# Patient Record
Sex: FEMALE | Race: WHITE | NOT HISPANIC OR LATINO | Employment: FULL TIME | URBAN - METROPOLITAN AREA
[De-identification: names, ages, dates, MRNs, and addresses within clinical notes are randomized per-mention and may not be internally consistent; named-entity substitution may affect disease eponyms.]

---

## 2017-02-02 ENCOUNTER — PATIENT MESSAGE (OUTPATIENT)
Dept: OBSTETRICS AND GYNECOLOGY | Facility: CLINIC | Age: 37
End: 2017-02-02

## 2017-02-02 ENCOUNTER — TELEPHONE (OUTPATIENT)
Dept: OBSTETRICS AND GYNECOLOGY | Facility: CLINIC | Age: 37
End: 2017-02-02

## 2017-02-02 ENCOUNTER — OFFICE VISIT (OUTPATIENT)
Dept: OBSTETRICS AND GYNECOLOGY | Facility: CLINIC | Age: 37
End: 2017-02-02
Payer: COMMERCIAL

## 2017-02-02 ENCOUNTER — LAB VISIT (OUTPATIENT)
Dept: LAB | Facility: HOSPITAL | Age: 37
End: 2017-02-02
Payer: COMMERCIAL

## 2017-02-02 VITALS
DIASTOLIC BLOOD PRESSURE: 72 MMHG | HEIGHT: 67 IN | WEIGHT: 155.88 LBS | SYSTOLIC BLOOD PRESSURE: 116 MMHG | BODY MASS INDEX: 24.47 KG/M2

## 2017-02-02 DIAGNOSIS — Z82.49 FAMILY HISTORY OF HEART ATTACK: ICD-10-CM

## 2017-02-02 DIAGNOSIS — Z13.220 SCREENING CHOLESTEROL LEVEL: ICD-10-CM

## 2017-02-02 DIAGNOSIS — Z12.31 SCREENING MAMMOGRAM FOR HIGH-RISK PATIENT: ICD-10-CM

## 2017-02-02 DIAGNOSIS — Z13.1 DIABETES MELLITUS SCREENING: ICD-10-CM

## 2017-02-02 DIAGNOSIS — E78.2 MIXED HYPERLIPIDEMIA: ICD-10-CM

## 2017-02-02 DIAGNOSIS — R92.8 ABNORMAL MAMMOGRAM: ICD-10-CM

## 2017-02-02 DIAGNOSIS — Z01.419 WOMEN'S ANNUAL ROUTINE GYNECOLOGICAL EXAMINATION: Primary | ICD-10-CM

## 2017-02-02 DIAGNOSIS — Z72.0 TOBACCO USE: ICD-10-CM

## 2017-02-02 LAB
CHOLEST/HDLC SERPL: 3.4 {RATIO}
HDL/CHOLESTEROL RATIO: 29.2 %
HDLC SERPL-MCNC: 236 MG/DL
HDLC SERPL-MCNC: 69 MG/DL
LDLC SERPL CALC-MCNC: 146 MG/DL
NONHDLC SERPL-MCNC: 167 MG/DL
TRIGL SERPL-MCNC: 105 MG/DL

## 2017-02-02 PROCEDURE — 99999 PR PBB SHADOW E&M-EST. PATIENT-LVL IV: CPT | Mod: PBBFAC,,, | Performed by: NURSE PRACTITIONER

## 2017-02-02 PROCEDURE — 80061 LIPID PANEL: CPT

## 2017-02-02 PROCEDURE — 88175 CYTOPATH C/V AUTO FLUID REDO: CPT

## 2017-02-02 PROCEDURE — 83036 HEMOGLOBIN GLYCOSYLATED A1C: CPT

## 2017-02-02 PROCEDURE — 99395 PREV VISIT EST AGE 18-39: CPT | Mod: S$GLB,,, | Performed by: NURSE PRACTITIONER

## 2017-02-02 PROCEDURE — 87624 HPV HI-RISK TYP POOLED RSLT: CPT

## 2017-02-02 RX ORDER — LINACLOTIDE 145 UG/1
CAPSULE, GELATIN COATED ORAL
Refills: 3 | COMMUNITY
Start: 2016-12-15 | End: 2017-09-25

## 2017-02-02 NOTE — TELEPHONE ENCOUNTER
Dr. Baptiste patient was just in this morning-was given a referral for Cardiologist in [place] and doesn't want to go down there,can you refer someone else?and E>J> just mailed her a letter about repeated her ultrasound of breast and made an appt for a stephen- is this the correct appt to have?Please call patient to further discuss at [phone]

## 2017-02-02 NOTE — PATIENT INSTRUCTIONS
Things to Remember about Feminine Hygiene and Safety   1. Wipe from front to back after using the bathroom   2. If possible, urinate before and definitely after sexual intercourse or masturbation   3. I recommend bathing with liquid soap vs a bar soap. Non-scented antibacterial soap: Dial or Neutrogena soap   4. Shower or rinse off before sitting in a bathtub full of dirty water   5. Do not douche of any kind   6. It is recommended that you take in plenty of fluids. Eight glasses of 8 ounces of water is recommended daily (UNLESS MEDICAL PROBLEMS INDICATE OTHERWISE)   7. It is recommended to change tampons and pads every 2-3 hours or as saturated   8. Use condoms to protect yourself against Sexually Transmitted Infections   9. Wear your seat belt    Self Breast Exam/Awareness (SBE)  1. Examine your breast monthly, about 4-7 days after the start of your cycle. If you no longer get a cycle pick the same day of the month to perform SBE.  2. Look for any dimpling, rashes, nipple discharge, or changes in breast size  3. Feel for any lumps or hard nodules (example, frozen vegetable) using the pads of your fingers. Make sure to examine under the arm  4. The best time to perform examine is either while in the shower or while lying down.  5. You know your breast and if you are more aware of what your breasts normally feel like then you will be able to identify changes more quickly and notify your health care provider    Tobacco Resources    Pick a start date and stick with it   OTC options: nicotine gum, patch, lozenge   Talk with your primary care doctor about prescription options: Nicotine nasal spray, Bupropion (pill), Varenicline (pill)   Inform clothes family and friends of quit date for support   Call 9-021-XOSBWGC (859-8385) to be connected with the quit line in your state (free)   Avoid being around smoking, avoid alcohol, eat healthy food and exercise   Keep positive attitude, You Can Do It!    Healthy Weight  Resources   Myfitnesspal (can be found on [employer] zoe store--free)   National Weight Control Registry    Follow the 9 inch plate method below to help with portion control. ( ½ plate vegetable, ¼ plate starch and ¼ plate protein) After Start incorporating healthy foods in each plate section.   DASH Diet--lowering blood pressure (http://www.nhlbi.nih.gov/health/public/heart/hbp/dash/new_dash.pdf)     Calcium and Vitamin D  Prepared for the subscribers of  Pharmacists Letter / Prescribers Letter to give to their patients.  Copyright © 2012 by [employer]  www.pharmacistsletter.com ~ www.prescribersletter.com  Why do I need calcium and vitamin D?  Calcium and vitamin D are needed for strong bones. Nerves, muscles, and blood  vessels need calcium to work. Vitamin D helps the body absorb calcium. Vitamin D  helps strengthen muscles and prevent falls in older people. Vitamin D might also  help prevent cancer and heart disease.  What are sources of calcium and vitamin D?  Calcium is found in foods. Dairy products are good sources. Eight ounces of yogurt  (228 gram) or milk (1 cup [236 mL]), or 1.5 oz. (43 gram) of cheese, can provide  around 300 mg. Orange juice with added calcium has 300 mg per 8 oz. (236 mL)  serving. Vitamin D is made by sun-exposed skin. It is also found in some foods.  [place] is one of the best sources. A 3 oz. (86 gram) serving of sockeye salmon has  almost 800 IU. A 3 oz. serving of tuna canned in water has about  150 IU. Dairy products with added vitamin D are good sources. Examples are a cup  of milk (115 to 124 IU) or 6 ozs. (171 grams) of yogurt (80 IU). A cup of orange  juice with added vitamin D has 80 IU. Calcium and vitamin D supplements are also  available.  Do I need a supplement? Are they safe?  Many people are low on vitamin D. It is hard to get enough vitamin D from food.  And most people dont get much sun exposure. They use sunscreens, stay indoors, or  live at a  northern latitude. So most people need a vitamin D supplement. Ask if you  should have your vitamin D level checked.  People get about 300 mg calcium from nondairy foods daily. If you eat two servings  of high-calcium foods (e.g., dairy), you can get around 900 mg per day total. Adding  a 300 mg calcium supplement daily, or a third high-calcium serving, will provide a  total of 1200 mg daily. You may have heard calcium supplements are not safe.  There has been bad press about heart attacks and prostate cancer. Calcium  supplements have not been proven unsafe. But dont go overboard with calcium  supplements. Get your calcium from diet when possible. Avoid calcium  supplements from coral or dolomite, a kind of limestone. They can contain heavy  metals like lead.  How do I choose a calcium or vitamin D supplement?  Most calcium products are calcium carbonate (e.g., Tums, Caltrate) or calcium  citrate (e.g., Citracal). Both work. Calcium carbonate doesnt cost much and  provides the most calcium per dose. Read the label to check the calcium amount  per serving. This can vary based on the type of calcium you select. Calcium  Calcium and Vitamin D  Prepared for the subscribers of  Pharmacists Letter / Prescribers Letter to give to their patients.  Copyright © 2012 by [employer]  www.pharmacistsletter.com ~ www.prescribersletter.com  citrate may be better for patients who dont absorb calcium as well. Examples are  older people or those on certain heartburn medications. Calcium is best absorbed if  not more than 500 mg is taken at a time. Some supplements contain other  ingredients (e.g., magnesium, vitamin K). These dont work any better than those  with just calcium. Vitamin D is available over-the-counter in some calcium products  or by itself. There are also high-dose vitamin D products that are prescribed if you  have low vitamin D levels. It is okay to take a multivitamin or eat vitamin  Dcontaining  foods while taking prescription-strength vitamin D. Vitamin D comes as  vitamin D2 or vitamin D3. Either can be used. In the [place] look for a vitamin D  supplement that is USP Verified. In [place], look for a product with a Natural  Product Number (NPN). These products have been tested for good quality.  How much calcium and vitamin D do I need?  Women up to 50 years old and men up to age 70 should aim for 1000 mg of calcium  daily total (from food and supplements). Women over 50 years old and men over  70 should aim for 1200 mg of calcium daily total (from food and supplements).  Most experts recommend that adults get 800 IU to 2000 IU of vitamin D daily for the  best health benefits.  [June 2012]

## 2017-02-02 NOTE — PROGRESS NOTES
Chief Complaint  · Annual Exam    HPI  1. . GINGER--36 y.o. F  presents today for WWE. Her last pap smear was in/around . The pap smear at that time was neg, and she reports a history of all normal pap smears    She has not partner changes since last STI testing. She Denies DV    2. Menstrual Cycles---Patient's last menstrual period was 2017 (exact date). Cycles have been monthly    3. Contraception--Partner with vasectomy    Health Management/Maintenance   Colonoscopy or FOBT--not indicated     Bone Density/DEXA--not indicated     Mammogram--- and abnormal with possible fibroadenoma     Lipid Panel- and not certain of exact results     Hemoglobin Q9t--7425/2016 normal    Past Medical History   Diagnosis Date    Alopecia     Anorexia      as a teenager    Esophageal reflux     Hypercholesteremia     IBS (irritable bowel syndrome)      With constipation    Left breast lump      labor in third trimester     Tobacco use      Past Surgical History   Procedure Laterality Date     section       times 2     OB History      Para Term  AB TAB SAB Ectopic Multiple Living    2 2 1 1 0  0   3        Obstetric Comments    Menarche ~10        Social History     Social History Main Topics    Smoking status: Current Every Day Smoker    Smokeless tobacco: Not on file    Alcohol use Yes      Comment: social    Drug use: No    Sexual activity: Yes     Partners: Male     Birth control/ protection: Partner-Vasectomy        ROS   Systemic: No fever, no bleeding, no recent weight loss, and no recent weight gain.  Breasts: No breast lump and no nipple discharge. No rashes  Cardiovascular: No chest pain or discomfort and no palpitations.  Pulmonary: No dyspnea, no cough, no hemoptysis, and no wheezing.  Gastrointestinal: No nausea, no bloating, no abdominal pain, and no hematochezia.  No diarrhea and no constipation.  Genitourinary: No hematuria, no urinary loss  "of control, and no dysuria.  Skin: No rash.  Visit Vitals    /72    Ht 5' 7" (1.702 m)    Wt 70.7 kg (155 lb 13.8 oz)    LMP 01/25/2017 (Exact Date)    BMI 24.41 kg/m2       Physical Exam   General Appearance: ° Well developed.  ° Well nourished.  Neck: Trachea: ° Showed no visual abnormalities.  Lymph Nodes:° No axillary adenopathy.  Breasts:General/bilateral: ° Appearance of the breast was normal.  ° Palpation of the breast revealed no abnormalities.  Lungs:°No use of accessory muscles noted or signs of respiratory distress    Cardiovascular:°No lower extremity swelling observed or palpated  Abdomen:Palpation: ° No abdominal tenderness.  ° No mass was palpated in the abdomen. °No enlarged liver or spleen noted or presence of hernia   Urinary System:Bladder: ° Normal. Urethra: ° Meatus showed no abnormalities.  ° No mass on the urethra.  Genitalia:External: ° Vulva was normal.  ° Genitalia exhibited no lesion.  Vagina: ° Mucosa was normal.  ° No vaginal discharge was observed.  Pelvic:° No ovarian mass. Cervix: ° No cervical discharge.  ° Showed no lesion.  ° Not tender. Smooth, pink and moist Uterus: ° Position was normal.  ° Did not have a mass, no enlargement.  ° Not tender. Uterine Adnexae: ° Uterine adnexa was not tender.  ° No tubal mass was noted.  Perineum:° Normal.  ° Did not have a mass.  Rectal: Rectum: ° Had no mass.  Neurological: ° No disorientation was observed.  Psychiatric: Affect: ° Normal.  Skin: ° No skin lesions/rashes visualized.  ° No perineal lesions.    Assessment/Plan:  1. WWE-annual WWE completed with a CBE.  Pap smear with HPV and then routine testing Normal CBE today.  Patient to f/u in one year for annual WWE.      2. CBE--CBE today; Pt will need a MMG now with h/o abnormal         3. Education handout on preventative Feminine Hygiene and SBE/Breast awareness was provided to the patient.       4. STD screening--not obtained      5. Weight-BMI is abnormal.  Pt to have healthy " diet and exercise. Handout on healthy resources provided     6. HCM-discussed with pt when a MMG will be needed.  The pt does not need a DEXA scan at this time. Handout on Calcium and Vit D provided on how much to take OTC and dietary     7. Contraception--partner with vasectomy    8. Smoking--discussed risks of smoking and encouraged pt to quit smoking. She will attempt on her own and if not better will send in medications    9. FHx of early MI--card referral, A1C and lipid panel, referral to [facility]    Pt to RTC prn and as scheduled for annual exam.

## 2017-02-02 NOTE — MR AVS SNAPSHOT
"    [facility]  [address]  [place] [address]  Phone: [phone]  Fax: [phone]                  Gema De Oliveira   2017 9:00 AM   Office Visit    Description:  Female : 1980   Provider:  Galina Orozco NP   Department:  [facility]           Reason for Visit     Annual Exam           Diagnoses this Visit        Comments    Women's annual routine gynecological examination    -  Primary     Tobacco use         Abnormal mammogram         Screening cholesterol level         Diabetes mellitus screening         Family history of heart attack     early with mom in 40s and dad in 30s    Mixed hyperlipidemia         Screening mammogram for high-risk patient                To Do List           Future Appointments        Provider Department Dept Phone    2017 9:50 AM LAB, [facility] [place] -  Lab       Goals (5 Years of Data)     None      Follow-Up and Disposition     Return for Annual and PRN.      [facility]     [facility] Care [facility] -  Assistance  Registered nurses in the [facility] provide clinical advisement, health education, appointment booking, and other advisory services.  Call for this free service at [phone].             Medications                Verify that the below list of medications is an accurate representation of the medications you are currently taking.  If none reported, the list may be blank. If incorrect, please contact your healthcare provider. Carry this list with you in case of emergency.           Current Medications     LINZESS 145 mcg Cap capsule TK 1 C PO ONCE D ON AN EMPTY STOMACH           Clinical Reference Information           Vital Signs - Last Recorded  Most recent update: 2017  9:14 AM by Steffanie Arango MA    BP Ht Wt LMP BMI    116/72 5' 7" (1.702 m) 70.7 kg (155 lb 13.8 oz) 2017 (Exact Date) 24.41 kg/m2      Blood Pressure          Most " Recent Value    BP  116/72      Allergies as of 2/2/2017     No Known Allergies      Immunizations Administered on Date of Encounter - 2/2/2017     None      Orders Placed During Today's Visit      Normal Orders This Visit    Ambulatory Referral to Cardiology     HPV DNA probe, amplified     Liquid-based pap smear, screening     Mammo Digital Screening Bilat with Tomosynthesis CAD     Future Labs/Procedures Expected by Expires    Hemoglobin A1c  2/2/2017 4/3/2018    Lipid panel  2/2/2017 4/3/2018    Mammo Digital Screening Bilat With CAD  2/2/2017 4/2/2018    Mammo Digital Screening Bilat with Tomosynthesis CAD  2/2/2017 4/4/2018      Instructions    Things to Remember about Feminine Hygiene and Safety   1. Wipe from front to back after using the bathroom   2. If possible, urinate before and definitely after sexual intercourse or masturbation   3. I recommend bathing with liquid soap vs a bar soap. Non-scented antibacterial soap: Dial or Neutrogena soap   4. Shower or rinse off before sitting in a bathtub full of dirty water   5. Do not douche of any kind   6. It is recommended that you take in plenty of fluids. Eight glasses of 8 ounces of water is recommended daily (UNLESS MEDICAL PROBLEMS INDICATE OTHERWISE)   7. It is recommended to change tampons and pads every 2-3 hours or as saturated   8. Use condoms to protect yourself against Sexually Transmitted Infections   9. Wear your seat belt    Self Breast Exam/Awareness (SBE)  1. Examine your breast monthly, about 4-7 days after the start of your cycle. If you no longer get a cycle pick the same day of the month to perform SBE.  2. Look for any dimpling, rashes, nipple discharge, or changes in breast size  3. Feel for any lumps or hard nodules (example, frozen vegetable) using the pads of your fingers. Make sure to examine under the arm  4. The best time to perform examine is either while in the shower or while lying down.  5. You know your breast and if you are  more aware of what your breasts normally feel like then you will be able to identify changes more quickly and notify your health care provider    Tobacco Resources    Pick a start date and stick with it   OTC options: nicotine gum, patch, lozenge   Talk with your primary care doctor about prescription options: Nicotine nasal spray, Bupropion (pill), Varenicline (pill)   Inform clothes family and friends of quit date for support   Call 5-687-OFUOJPD (723-3592) to be connected with the quit line in your state (free)   Avoid being around smoking, avoid alcohol, eat healthy food and exercise   Keep positive attitude, You Can Do It!    Healthy Weight Resources   Myfitnesspal (can be found on [employer] zoe store--free)   National Weight Control Registry    Follow the 9 inch plate method below to help with portion control. ( ½ plate vegetable, ¼ plate starch and ¼ plate protein) After Start incorporating healthy foods in each plate section.   DASH Diet--lowering blood pressure (http://www.nhlbi.nih.gov/health/public/heart/hbp/dash/new_dash.pdf)     Calcium and Vitamin D  Prepared for the subscribers of  Pharmacists Letter / Prescribers Letter to give to their patients.  Copyright © 2012 by [employer]  www.pharmacistsletter.com ~ www.prescribersletter.com  Why do I need calcium and vitamin D?  Calcium and vitamin D are needed for strong bones. Nerves, muscles, and blood  vessels need calcium to work. Vitamin D helps the body absorb calcium. Vitamin D  helps strengthen muscles and prevent falls in older people. Vitamin D might also  help prevent cancer and heart disease.  What are sources of calcium and vitamin D?  Calcium is found in foods. Dairy products are good sources. Eight ounces of yogurt  (228 gram) or milk (1 cup [236 mL]), or 1.5 oz. (43 gram) of cheese, can provide  around 300 mg. Orange juice with added calcium has 300 mg per 8 oz. (236 mL)  serving. Vitamin D is made by sun-exposed  skin. It is also found in some foods.  [place] is one of the best sources. A 3 oz. (86 gram) serving of sockeye salmon has  almost 800 IU. A 3 oz. serving of tuna canned in water has about  150 IU. Dairy products with added vitamin D are good sources. Examples are a cup  of milk (115 to 124 IU) or 6 ozs. (171 grams) of yogurt (80 IU). A cup of orange  juice with added vitamin D has 80 IU. Calcium and vitamin D supplements are also  available.  Do I need a supplement? Are they safe?  Many people are low on vitamin D. It is hard to get enough vitamin D from food.  And most people dont get much sun exposure. They use sunscreens, stay indoors, or  live at a northern latitude. So most people need a vitamin D supplement. Ask if you  should have your vitamin D level checked.  People get about 300 mg calcium from nondairy foods daily. If you eat two servings  of high-calcium foods (e.g., dairy), you can get around 900 mg per day total. Adding  a 300 mg calcium supplement daily, or a third high-calcium serving, will provide a  total of 1200 mg daily. You may have heard calcium supplements are not safe.  There has been bad press about heart attacks and prostate cancer. Calcium  supplements have not been proven unsafe. But dont go overboard with calcium  supplements. Get your calcium from diet when possible. Avoid calcium  supplements from coral or dolomite, a kind of limestone. They can contain heavy  metals like lead.  How do I choose a calcium or vitamin D supplement?  Most calcium products are calcium carbonate (e.g., Tums, Caltrate) or calcium  citrate (e.g., Citracal). Both work. Calcium carbonate doesnt cost much and  provides the most calcium per dose. Read the label to check the calcium amount  per serving. This can vary based on the type of calcium you select. Calcium  Calcium and Vitamin D  Prepared for the subscribers of  Pharmacists Letter / Prescribers Letter to give to their patients.  Copyright © 2012 by  [employer]  www.pharmacistsletter.com ~ www.prescribersletter.com  citrate may be better for patients who dont absorb calcium as well. Examples are  older people or those on certain heartburn medications. Calcium is best absorbed if  not more than 500 mg is taken at a time. Some supplements contain other  ingredients (e.g., magnesium, vitamin K). These dont work any better than those  with just calcium. Vitamin D is available over-the-counter in some calcium products  or by itself. There are also high-dose vitamin D products that are prescribed if you  have low vitamin D levels. It is okay to take a multivitamin or eat vitamin Dcontaining  foods while taking prescription-strength vitamin D. Vitamin D comes as  vitamin D2 or vitamin D3. Either can be used. In the [place] look for a vitamin D  supplement that is USP Verified. In [place], look for a product with a Natural  Product Number (NPN). These products have been tested for good quality.  How much calcium and vitamin D do I need?  Women up to 50 years old and men up to age 70 should aim for 1000 mg of calcium  daily total (from food and supplements). Women over 50 years old and men over  70 should aim for 1200 mg of calcium daily total (from food and supplements).  Most experts recommend that adults get 800 IU to 2000 IU of vitamin D daily for the  best health benefits.  [June 2012]         Smoking Cessation     If you would like to quit smoking:   You may be eligible for free services if you are a [place] resident and started smoking cigarettes before September 1, 1988.  Call the Smoking Cessation Trust (SCT) toll free at (039) 847-1786 or (083) 711-3845.   Call 1-800-QUIT-NOW if you do not meet the above criteria.

## 2017-02-03 ENCOUNTER — PATIENT MESSAGE (OUTPATIENT)
Dept: OBSTETRICS AND GYNECOLOGY | Facility: CLINIC | Age: 37
End: 2017-02-03

## 2017-02-03 LAB
ESTIMATED AVG GLUCOSE: 105 MG/DL
HBA1C MFR BLD HPLC: 5.3 %

## 2017-02-03 NOTE — TELEPHONE ENCOUNTER
Dr Baptiste pt calling, Pt saw Galina yesterday and her results came through the portal and would like you to call her back letting her know what they mean. Also so would like to know of a Cardiologist she would refer in [place]. Pt # [phone]

## 2017-02-06 ENCOUNTER — TELEPHONE (OUTPATIENT)
Dept: OBSTETRICS AND GYNECOLOGY | Facility: CLINIC | Age: 37
End: 2017-02-06

## 2017-02-06 ENCOUNTER — HOSPITAL ENCOUNTER (OUTPATIENT)
Dept: RADIOLOGY | Facility: HOSPITAL | Age: 37
Discharge: HOME | End: 2017-02-06
Attending: NURSE PRACTITIONER
Payer: COMMERCIAL

## 2017-02-06 DIAGNOSIS — R92.8 ABNORMAL ULTRASOUND OF BREAST: Primary | ICD-10-CM

## 2017-02-06 DIAGNOSIS — Z12.31 SCREENING MAMMOGRAM FOR HIGH-RISK PATIENT: ICD-10-CM

## 2017-02-06 DIAGNOSIS — R92.8 ABNORMAL ULTRASOUND OF BREAST: ICD-10-CM

## 2017-02-06 NOTE — TELEPHONE ENCOUNTER
Pt called back and said this msg was deleted from her portal can you please resend this msg through the portal to the pt again.Thanks

## 2017-02-06 NOTE — TELEPHONE ENCOUNTER
Pt called and was having issues with her breast u/s and mammogram. Pt states that she released her records from  to [facility] but [facility] never received the [facility]. Pt also states that [facility] would not perform the breast u/s and mammogram due to not having the films. Pt now wants to do it at , please sends orders ASAP. Pt is very upset.

## 2017-02-08 ENCOUNTER — PATIENT MESSAGE (OUTPATIENT)
Dept: OBSTETRICS AND GYNECOLOGY | Facility: CLINIC | Age: 37
End: 2017-02-08

## 2017-02-08 LAB — HUMAN PAPILLOMAVIRUS (HPV): NOT DETECTED

## 2017-02-10 ENCOUNTER — TELEPHONE (OUTPATIENT)
Dept: OBSTETRICS AND GYNECOLOGY | Facility: CLINIC | Age: 37
End: 2017-02-10

## 2017-02-10 NOTE — TELEPHONE ENCOUNTER
Dr. Baptiste's pt calling, pt states that she is having really bad anxiety and would like a Rx sent to [facility] [phone]. Pt states that she is trying to quit smoking and since anxiety has gotten worse. Pt states that she has also been dealing with  who's brother  6 months ago and is taking it very hard. Pt states that she can start off with a low dose. Please call pt at [phone]

## 2017-02-13 NOTE — TELEPHONE ENCOUNTER
Emile pt stating she is very irritable and short tempered and that she is dealing with a lot of anxiety. She has an appt with counselor for Wednesday. States that she has been trying to quit smoking but she went and bought a pack on Friday and Saturday. Denies suicidal or homicidal thoughts. She said that she has been dealing with losing her father in law six months ago and dealing with 's depression and anxiety because of this. She states that it has been a lot harder than they thought it would be to not have him around. She would like to see id Dr. Baptiste can call her in something low dose for the anxiety just to take the edge off. Allergies and pharmacies are up to date.

## 2017-02-13 NOTE — TELEPHONE ENCOUNTER
Pt [place] appt. Advised that the earliest she could get an appt in [place] would be March 24. Pt said that was fin and would like to be put on the wait list as well. Scheduled pt to see Dr. Baptiste on March 24 and added her to the wait list.

## 2017-02-13 NOTE — TELEPHONE ENCOUNTER
See if she can come in on Wednesday or Thursday this week to see me for a visit and discuss. I prefer to have them come in for appt for any depression or anxiety, especially if they want medicine

## 2017-02-14 ENCOUNTER — PATIENT MESSAGE (OUTPATIENT)
Dept: OBSTETRICS AND GYNECOLOGY | Facility: CLINIC | Age: 37
End: 2017-02-14

## 2017-03-17 ENCOUNTER — OFFICE VISIT (OUTPATIENT)
Dept: OBSTETRICS AND GYNECOLOGY | Facility: CLINIC | Age: 37
End: 2017-03-17
Payer: COMMERCIAL

## 2017-03-17 VITALS
HEIGHT: 67 IN | BODY MASS INDEX: 24.22 KG/M2 | WEIGHT: 154.31 LBS | DIASTOLIC BLOOD PRESSURE: 68 MMHG | SYSTOLIC BLOOD PRESSURE: 114 MMHG

## 2017-03-17 DIAGNOSIS — F41.1 GAD (GENERALIZED ANXIETY DISORDER): Primary | ICD-10-CM

## 2017-03-17 PROCEDURE — 1160F RVW MEDS BY RX/DR IN RCRD: CPT | Mod: S$GLB,,, | Performed by: OBSTETRICS & GYNECOLOGY

## 2017-03-17 PROCEDURE — 99212 OFFICE O/P EST SF 10 MIN: CPT | Mod: S$GLB,,, | Performed by: OBSTETRICS & GYNECOLOGY

## 2017-03-17 PROCEDURE — 99999 PR PBB SHADOW E&M-EST. PATIENT-LVL III: CPT | Mod: PBBFAC,,, | Performed by: OBSTETRICS & GYNECOLOGY

## 2017-03-17 RX ORDER — BUPROPION HYDROCHLORIDE 150 MG/1
150 TABLET ORAL DAILY
Qty: 30 TABLET | Refills: 11 | Status: SHIPPED | OUTPATIENT
Start: 2017-03-17 | End: 2018-06-05 | Stop reason: SDUPTHER

## 2017-03-17 NOTE — MR AVS SNAPSHOT
[facility]  [address] [facility]  [place] [address]  Phone: [phone]  Fax: [phone]                  Gema De Oliveira   3/17/2017 11:45 AM   Office Visit    Description:  Female : 1980   Provider:  Magaly Baptiste MD   Department:  [facility]           Reason for Visit     Consult           Diagnoses this Visit        Comments    SEVEN (generalized anxiety disorder)    -  Primary            To Do List           Future Appointments        Provider Department Dept Phone    2017 10:45 AM Magaly Baptiste MD [facility] [phone]      Goals (5 Years of Data)     None       These Medications        Disp Refills Start End    buPROPion (WELLBUTRIN XL) 150 MG TB24 tablet 30 tablet 11 3/17/2017 3/17/2018    Take 1 tablet (150 mg total) by mouth once daily. - Oral    Pharmacy: [facility] [address] [place] [address] ALEXANDRU SAMAYOA [facility]  AT [facility] Ph #: [phone]         [facility] On Call     [facility] On Call Nurse Care Line -  Assistance  Registered nurses in the [facility] On Call Center provide clinical advisement, health education, appointment booking, and other advisory services.  Call for this free service at [phone].             Medications           START taking these NEW medications        Refills    buPROPion (WELLBUTRIN XL) 150 MG TB24 tablet 11    Sig: Take 1 tablet (150 mg total) by mouth once daily.    Class: Normal    Route: Oral           Verify that the below list of medications is an accurate representation of the medications you are currently taking.  If none reported, the list may be blank. If incorrect, please contact your healthcare provider. Carry this list with you in case of emergency.           Current Medications     buPROPion (WELLBUTRIN XL) 150 MG TB24 tablet Take 1 tablet (150 mg total) by mouth once daily.    LINZESS 145 mcg Cap capsule TK 1 C PO ONCE D ON AN EMPTY STOMACH          "  Clinical Reference Information           Your Vitals Were     BP Height Weight Last Period BMI    114/68 5' 7" (1.702 m) 70 kg (154 lb 5.2 oz) 03/14/2017 24.17 kg/m2      Blood Pressure          Most Recent Value    BP  114/68      Allergies as of 3/17/2017     No Known Allergies      Immunizations Administered on Date of Encounter - 3/17/2017     None      Smoking Cessation     If you would like to quit smoking:   You may be eligible for free services if you are a [place] resident and started smoking cigarettes before September 1, 1988.  Call the [employer]oking [employer]ess[employer] ([employer]) toll free at (025) 943-2316 or (663) 129-3498.   Call 6-975-QUIT-NOW if you do not meet the above criteria.            Language Assistance Services     ATTENTION: Language assistance services are available, free of charge. Please call [phone].      ATENCIÓN: Si habla betito, tiene a magallanes disposición servicios gratuitos de asistencia lingüística. Llame al [phone].     CHÚ Ý: N?u b?n nói Ti?ng Vi?t, có các d?ch v? h? tr? ngôn ng? mi?n phí dành cho b?n. G?i s? [phone].         [facility]'s [facility] complies with applicable Federal civil rights laws and does not discriminate on the basis of race, color, national origin, age, disability, or sex.        "

## 2017-03-17 NOTE — PROGRESS NOTES
Subjective:       Patient ID: Gema De Oliveira is a 36 y.o. female.    Chief Complaint:  Consult (pt here wanting to get on meds to stop smoking )      There is no problem list on file for this patient.      History of Present Illness  36 y.o. yo  here for evaluation of anxiety. Father in law passed away 9 months ago. Having a hard time. Their family was very close. Also trying to quit smoking. rec try smoking cessation class vs Wellbutrin. Pt wants to try the Wellbutrin. Talk x 20 min. Form filled out for her work that she can still work on this medicine. All questions answered. Smokes a pack a day. No HI, no SI      Past Medical History:   Diagnosis Date    Alopecia     Anorexia     as a teenager    Esophageal reflux     Hypercholesteremia     IBS (irritable bowel syndrome)     With constipation    Left breast lump      labor in third trimester     Tobacco use        Past Surgical History:   Procedure Laterality Date     SECTION      times 2       OB History    Para Term  AB SAB TAB Ectopic Multiple Living   2 2 1 1 0 0    3      # Outcome Date GA Lbr Rogelio/2nd Weight Sex Delivery Anes PTL Lv   2 Term  39w0d  3.175 kg (7 lb) F CS-LTranv   Y      Complications: Breech presentation   1   35w0d  1.956 kg (4 lb 5 oz) F CS-LVertical         Complications: Preeclampsia      Obstetric Comments   Menarche ~10       Patient's last menstrual period was 2017.   Date of Last Pap: 2/10/2017    Review of Systems  Review of Systems   Constitutional: Negative for fatigue and unexpected weight change.   Respiratory: Negative for shortness of breath.    Cardiovascular: Negative for chest pain.   Gastrointestinal: Negative for abdominal pain, constipation, diarrhea, nausea and vomiting.   Genitourinary: Negative for dysuria.   Musculoskeletal: Negative for back pain.   Skin: Negative for rash.   Neurological: Negative for headaches.   Hematological: Does not  bruise/bleed easily.   Psychiatric/Behavioral: Negative for behavioral problems.        Objective:   Physical Exam:   Constitutional: She appears well-developed and well-nourished.                                  Assessment/ Plan:     1. SEVEN (generalized anxiety disorder)  buPROPion (WELLBUTRIN XL) 150 MG TB24 tablet       Follow-up with me in 1 year

## 2017-09-09 ENCOUNTER — PATIENT MESSAGE (OUTPATIENT)
Dept: OBSTETRICS AND GYNECOLOGY | Facility: CLINIC | Age: 37
End: 2017-09-09

## 2017-09-09 DIAGNOSIS — N93.9 ABNORMAL UTERINE BLEEDING (AUB): Primary | ICD-10-CM

## 2017-09-11 ENCOUNTER — TELEPHONE (OUTPATIENT)
Dept: OBSTETRICS AND GYNECOLOGY | Facility: CLINIC | Age: 37
End: 2017-09-11

## 2017-09-11 NOTE — TELEPHONE ENCOUNTER
Emile pt- pt called and said she can take that appt you offered with  on 9/25 at 3. She was wondering if she will have an u/s that day as well. Also pt said she has been really stressed lately, the wellbutrin doesn't seem to be helping. Pt said she has tried to call and schedule the [facility] but has not heard back from them. She said she can wait to talk to  about it at her appt.  Pt also said she received a letter from  letting her know it is time for a 6 month follow up mammo and breast u/s. Pt would like to know if she can get those faxed over.

## 2017-09-25 ENCOUNTER — OFFICE VISIT (OUTPATIENT)
Dept: OBSTETRICS AND GYNECOLOGY | Facility: CLINIC | Age: 37
End: 2017-09-25
Payer: COMMERCIAL

## 2017-09-25 ENCOUNTER — PATIENT MESSAGE (OUTPATIENT)
Dept: OBSTETRICS AND GYNECOLOGY | Facility: CLINIC | Age: 37
End: 2017-09-25

## 2017-09-25 VITALS — HEIGHT: 67 IN | SYSTOLIC BLOOD PRESSURE: 112 MMHG | DIASTOLIC BLOOD PRESSURE: 66 MMHG

## 2017-09-25 DIAGNOSIS — Z12.31 ENCOUNTER FOR SCREENING MAMMOGRAM FOR MALIGNANT NEOPLASM OF BREAST: Primary | ICD-10-CM

## 2017-09-25 DIAGNOSIS — N92.0 MENORRHAGIA WITH REGULAR CYCLE: Primary | ICD-10-CM

## 2017-09-25 PROCEDURE — 99213 OFFICE O/P EST LOW 20 MIN: CPT | Mod: S$GLB,,, | Performed by: OBSTETRICS & GYNECOLOGY

## 2017-09-25 PROCEDURE — 99999 PR PBB SHADOW E&M-EST. PATIENT-LVL III: CPT | Mod: PBBFAC,,, | Performed by: OBSTETRICS & GYNECOLOGY

## 2017-09-25 NOTE — LETTER
September 25, 2017      [facility]  [address]  [place] [address]  Phone: [phone]  Fax: [phone]       Patient: Gema De Oliveira   YOB: 1980  Date of Visit: 09/25/2017    To Whom It May Concern:    Gema De Oliveira  was at [facility] on 09/25/2017. She may return to work with no restrictions. If you have any questions or concerns, or if I can be of further assistance, please do not hesitate to contact me.    Sincerely,        Magaly Baptiste MD

## 2017-09-25 NOTE — PROGRESS NOTES
Subjective:       Patient ID: Gema De Oliveira is a 37 y.o. female.    Chief Complaint:  Vaginal Bleeding (last pap/hpv normal/neg 17, last mmg 17 birads 2)      There is no problem list on file for this patient.      History of Present Illness  Periods are heavy last 6 days, changes pads every 2 hours sometimes. Interested in some intervention. Gave all options OCP (except smokes so is not a good candidate) vs Mirena vs ablation v DVH, with no BSO. Talk x 20 min. Answered all questions.  Pt will think about it and let me know.     Past Medical History:   Diagnosis Date    Alopecia     Anorexia     as a teenager    Esophageal reflux     Hypercholesteremia     IBS (irritable bowel syndrome)     With constipation    Left breast lump      labor in third trimester     Tobacco use        Past Surgical History:   Procedure Laterality Date     SECTION      times 2       OB History    Para Term  AB Living   2 2 1 1 0 3   SAB TAB Ectopic Multiple Live Births   0       1      # Outcome Date GA Lbr Rogelio/2nd Weight Sex Delivery Anes PTL Lv   2 Term  39w0d  3.175 kg (7 lb) F CS-LTranv   JORGITO      Complications: Breech presentation   1   35w0d  1.956 kg (4 lb 5 oz) F CS-LVertical         Complications: Preeclampsia      Obstetric Comments   Menarche ~10       Patient's last menstrual period was 2017.   Date of Last Pap: 2/10/2017    Review of Systems  Review of Systems   Constitutional: Negative for fatigue and unexpected weight change.   Respiratory: Negative for shortness of breath.    Cardiovascular: Negative for chest pain.   Gastrointestinal: Negative for abdominal pain, constipation, diarrhea, nausea and vomiting.   Genitourinary: Negative for dysuria.   Musculoskeletal: Negative for back pain.   Skin: Negative for rash.   Neurological: Negative for headaches.   Hematological: Does not bruise/bleed easily.   Psychiatric/Behavioral: Negative for  behavioral problems.        Objective:   Physical Exam:   Constitutional: She appears well-developed and well-nourished.                                  Assessment/ Plan:     1. Menorrhagia with regular cycle         Follow-up with me in 1 year

## 2017-09-27 ENCOUNTER — TELEPHONE (OUTPATIENT)
Dept: OBSTETRICS AND GYNECOLOGY | Facility: CLINIC | Age: 37
End: 2017-09-27

## 2017-09-27 DIAGNOSIS — N63.0 BREAST LUMP OR MASS: Primary | ICD-10-CM

## 2017-09-27 NOTE — TELEPHONE ENCOUNTER
Dr. Baptiste-- pt needs order for a bilateral breast u/s. Pt states that [facility] called her to let her know that the orders were incorrect and she needed to call us to get the correct orders. Pt would like someone to call [facility] to make sure that we sent them the correct orders. Pt would also like to be called once completed. Pt's # [phone]

## 2017-09-29 ENCOUNTER — PATIENT MESSAGE (OUTPATIENT)
Dept: OBSTETRICS AND GYNECOLOGY | Facility: CLINIC | Age: 37
End: 2017-09-29

## 2017-10-16 ENCOUNTER — PATIENT MESSAGE (OUTPATIENT)
Dept: OBSTETRICS AND GYNECOLOGY | Facility: CLINIC | Age: 37
End: 2017-10-16

## 2018-06-05 DIAGNOSIS — F41.1 GAD (GENERALIZED ANXIETY DISORDER): ICD-10-CM

## 2018-06-05 RX ORDER — BUPROPION HYDROCHLORIDE 150 MG/1
TABLET ORAL
Qty: 30 TABLET | Refills: 0 | Status: SHIPPED | OUTPATIENT
Start: 2018-06-05 | End: 2019-12-03

## 2019-07-18 ENCOUNTER — TELEPHONE (OUTPATIENT)
Dept: PRIMARY CARE CLINIC | Facility: CLINIC | Age: 39
End: 2019-07-18

## 2019-07-18 NOTE — TELEPHONE ENCOUNTER
----- Message from Sarah Casanova sent at 7/18/2019  1:07 PM CDT -----  Contact: [phone]  Patient was last seen in June.  Patient is wanting to discuss a possible recommendation for Cardiology.  Nurse Prac at [facility] has contacted patient is wanting her to start taking Cholesterol Med.  Patient is wanting to discuss this futher with Dr Morin or a cardiologist.  Please call patient to discuss.

## 2019-07-18 NOTE — TELEPHONE ENCOUNTER
I sw patient. She recently had labs done and was concerned about her cholesterol levels. I set up an appt to come see  to further discuss treatment options/further recommendations. I went over the process of obtaining her records from [facility]. She will either come here or  to sign a release form.

## 2019-11-12 ENCOUNTER — TELEPHONE (OUTPATIENT)
Dept: PRIMARY CARE CLINIC | Facility: CLINIC | Age: 39
End: 2019-11-12

## 2019-11-12 NOTE — TELEPHONE ENCOUNTER
I sw pt and scheduled an appt for her. I mailed a medical records form to her as well to obtain her records from [facility]

## 2019-11-26 ENCOUNTER — PATIENT MESSAGE (OUTPATIENT)
Dept: DERMATOLOGY | Facility: CLINIC | Age: 39
End: 2019-11-26

## 2019-11-26 ENCOUNTER — OFFICE VISIT (OUTPATIENT)
Dept: DERMATOLOGY | Facility: CLINIC | Age: 39
End: 2019-11-26
Payer: COMMERCIAL

## 2019-11-26 DIAGNOSIS — L98.8 RHYTIDES: ICD-10-CM

## 2019-11-26 DIAGNOSIS — D48.5 NEOPLASM OF UNCERTAIN BEHAVIOR OF SKIN: Primary | ICD-10-CM

## 2019-11-26 DIAGNOSIS — L81.4 LENTIGO: ICD-10-CM

## 2019-11-26 DIAGNOSIS — L73.9 FOLLICULITIS: ICD-10-CM

## 2019-11-26 PROCEDURE — 88305 TISSUE EXAM BY PATHOLOGIST: CPT | Mod: 26,,, | Performed by: DERMATOLOGY

## 2019-11-26 PROCEDURE — 88305 TISSUE EXAM BY PATHOLOGIST: ICD-10-PCS | Mod: 26,,, | Performed by: DERMATOLOGY

## 2019-11-26 PROCEDURE — 99203 PR OFFICE/OUTPT VISIT, NEW, LEVL III, 30-44 MIN: ICD-10-PCS | Mod: 25,S$GLB,, | Performed by: DERMATOLOGY

## 2019-11-26 PROCEDURE — 99999 PR PBB SHADOW E&M-EST. PATIENT-LVL II: CPT | Mod: PBBFAC,,, | Performed by: DERMATOLOGY

## 2019-11-26 PROCEDURE — 99203 OFFICE O/P NEW LOW 30 MIN: CPT | Mod: 25,S$GLB,, | Performed by: DERMATOLOGY

## 2019-11-26 PROCEDURE — 88305 TISSUE EXAM BY PATHOLOGIST: CPT | Performed by: DERMATOLOGY

## 2019-11-26 PROCEDURE — 99999 PR PBB SHADOW E&M-EST. PATIENT-LVL II: ICD-10-PCS | Mod: PBBFAC,,, | Performed by: DERMATOLOGY

## 2019-11-26 RX ORDER — CLINDAMYCIN PHOSPHATE 11.9 MG/ML
SOLUTION TOPICAL 2 TIMES DAILY
Qty: 60 ML | Refills: 3 | Status: SHIPPED | OUTPATIENT
Start: 2019-11-26 | End: 2019-12-03

## 2019-11-26 NOTE — PATIENT INSTRUCTIONS
RETINOIDS           Your doctor has prescribed a topical retinoid for your skin. A retinoid is a vitamin A derived product used to treat a variety of skin conditions including acne, actinic keratoses (pre-skin cancers), uneven pigmentation from sun damage, fine lines and wrinkles, and enlarged pores.    How do they work?         Retinoids increase skin cell turn over from the normal 30 days to five or six days, minimizing clogged pores-the major factor in acne. Retinoids can also repair the DNA in cells damaged by the sun helping to even out skin pigmentation and clear pre-skin cancers. They can shrink oil glands and minimize the appearance of large pores. These effects can not be appreciated unless the medication is used on a consistent basis!    How do I use a retinoid?         After washing with a mild cleanser (Purpose, Aqua glycolic face cleanser, Cetaphil, Neutrogena deep cream cleanser), the skin should be moisturized with a non-retinol containing moisturizer such as Cerave PM. Then a thin layer of medication is applied to the forehead, nose cheeks, and chin (and around eyes if treating fine lines and wrinkles) at night. The amount of medication needed to cover the entire face should be no more than the size of a green pea. Irritation around the eyes can be treated with Vaseline at night.    What if my skin appears dry, red, and is peeling?          Retinoids do not cause dry skin but rather they cause the top layer of the skin the shed, giving an appearance of dry skin. In fact, new healthy skin cells are replacing older, damaged cells on the surface. This usually occurs the first 2-4 weeks as the skin is adjusting to the medication. It is reasonable to use the medication every other night or even every 2 nights until your skin adjusts. You can use a MILD exfoliant to remove the peeling skin (Aveeno daily clarifying pads, Aqua glycolic face cream) and can apply a moisturizer throughout the day as needed.  Retinoids come in a variety of strengths and vehicles and your doctor can find one best for you. If you cannot tolerate prescription strength retinoids, over the counter products with retinol may be beneficial. (Olay ProX wrinkle cream, GRANT deep wrinkle cream, Green Cream at [employer])    Will my skin be more sensitive in the sun?           You will need to use sunscreen with SPF 30 daily. Retinoids will cause the outermost layer of the skin to be thinner and thus more sensitive to ultraviolet rays. However, remember that over time, retinoids actually make the skin thicker by enhancing collagen deposition which protects the skin from sun damage.    When will I see results?            If you are using a retinoid for acne, you should see improvement in 6-8 weeks. Do not be alarmed if you find that your acne gets worse before it gets better-KEEP USING THE MEDICATION- this is a normal response and your acne will improve if you can stick with it.           If you are using the medication for anti-aging and skin dyspigmentation, you may see results in 3 months, but most effects are not visible until 6 months. Retinoids are clinically proven to reverse signs of aging, but only if used on a CONSTISTENT BASIS!             Remember that retinoids should not be used if you are pregnant.           Discontinue use 1 week prior to waxing, as skin is more likely to tear.      Shave Biopsy Wound Care    Your doctor has performed a shave biopsy today.  A band aid and vaseline ointment has been placed over the site.  This should remain in place for 24 hours.  It is recommended that you keep the area dry for the first 24 hours.  After 24 hours, you may remove the band aid and wash the area with warm soap and water and apply Vaseline jelly.  Many patients prefer to use Neosporin or Bacitracin ointment.  This is acceptable; however, know that you can develop an allergy to this medication even if you have used it safely for years.  It  "is important to keep the area moist.  Letting it dry out and get air slows healing time, and will worsen the scar.  Band aid is optional after first 24 hours.      If you notice increasing redness, tenderness, pain, or yellow drainage at the biopsy site, please notify your doctor.  These are signs of an infection.    If your biopsy site is bleeding, apply firm pressure for 15 minutes straight.  Repeat for another 15 minutes, if it is still bleeding.   If the surgical site continues to bleed, then please contact your doctor.      For [facility] users:   You will receive a [facility] notification after the pathologist has finished reviewing your biopsy specimen. Pathology results, however, will not be released online so you will see a "no content" message. Once your dermatologist reviews and clinically correlates your biopsy results, you will either receive a letter in the mail with the results of a phone call from your doctor's office if further explanation or treatment is warranted.       [address] [place] [address]/ [phone] FAX/ [email or web address]        "

## 2019-11-26 NOTE — PROGRESS NOTES
Subjective:       Patient ID:  Gema De Oliveira is a 39 y.o. female who presents for No chief complaint on file.    Spot  - Initial  Affected locations: left shoulder  Duration: 3 months  Signs / symptoms: scaling, peeling and redness (scabing)  Severity: mild to moderate  Timing: constant  Aggravated by: nothing  Relieving factors/Treatments tried: nothing        Review of Systems   Skin: Positive for activity-related sunscreen use. Negative for daily sunscreen use and recent sunburn.   Hematologic/Lymphatic: Does not bruise/bleed easily.        Objective:    Physical Exam   Constitutional: She appears well-developed and well-nourished. No distress.   Neurological: She is alert and oriented to person, place, and time. She is not disoriented.   Psychiatric: She has a normal mood and affect.   Skin:   Areas Examined (abnormalities noted in diagram):   Head / Face Inspection Performed  Neck Inspection Performed  Chest / Axilla Inspection Performed  Back Inspection Performed  RUE Inspected  LUE Inspection Performed                   Diagram Legend     Erythematous scaling macule/papule c/w actinic keratosis       Vascular papule c/w angioma      Pigmented verrucoid papule/plaque c/w seborrheic keratosis      Yellow umbilicated papule c/w sebaceous hyperplasia      Irregularly shaped tan macule c/w lentigo     1-2 mm smooth white papules consistent with Milia      Movable subcutaneous cyst with punctum c/w epidermal inclusion cyst      Subcutaneous movable cyst c/w pilar cyst      Firm pink to brown papule c/w dermatofibroma      Pedunculated fleshy papule(s) c/w skin tag(s)      Evenly pigmented macule c/w junctional nevus     Mildly variegated pigmented, slightly irregular-bordered macule c/w mildly atypical nevus      Flesh colored to evenly pigmented papule c/w intradermal nevus       Pink pearly papule/plaque c/w basal cell carcinoma      Erythematous hyperkeratotic cursted plaque c/w SCC      Surgical scar  with no sign of skin cancer recurrence      Open and closed comedones      Inflammatory papules and pustules      Verrucoid papule consistent consistent with wart     Erythematous eczematous patches and plaques     Dystrophic onycholytic nail with subungual debris c/w onychomycosis     Umbilicated papule    Erythematous-base heme-crusted tan verrucoid plaque consistent with inflamed seborrheic keratosis     Erythematous Silvery Scaling Plaque c/w Psoriasis     See annotation      Assessment / Plan:        There are no diagnoses linked to this encounter.         No follow-ups on file.

## 2019-12-03 ENCOUNTER — OFFICE VISIT (OUTPATIENT)
Dept: PRIMARY CARE CLINIC | Facility: CLINIC | Age: 39
End: 2019-12-03
Payer: COMMERCIAL

## 2019-12-03 VITALS
RESPIRATION RATE: 12 BRPM | BODY MASS INDEX: 24.53 KG/M2 | TEMPERATURE: 98 F | SYSTOLIC BLOOD PRESSURE: 108 MMHG | OXYGEN SATURATION: 99 % | HEIGHT: 67 IN | WEIGHT: 156.31 LBS | HEART RATE: 51 BPM | DIASTOLIC BLOOD PRESSURE: 74 MMHG

## 2019-12-03 DIAGNOSIS — E78.5 HYPERLIPIDEMIA, UNSPECIFIED HYPERLIPIDEMIA TYPE: Primary | ICD-10-CM

## 2019-12-03 DIAGNOSIS — F41.9 ANXIETY: ICD-10-CM

## 2019-12-03 DIAGNOSIS — H65.02 ACUTE SEROUS OTITIS MEDIA OF LEFT EAR, RECURRENCE NOT SPECIFIED: ICD-10-CM

## 2019-12-03 PROCEDURE — 99214 PR OFFICE/OUTPT VISIT, EST, LEVL IV, 30-39 MIN: ICD-10-PCS | Mod: S$GLB,,, | Performed by: INTERNAL MEDICINE

## 2019-12-03 PROCEDURE — 99214 OFFICE O/P EST MOD 30 MIN: CPT | Mod: S$GLB,,, | Performed by: INTERNAL MEDICINE

## 2019-12-03 PROCEDURE — 99999 PR PBB SHADOW E&M-EST. PATIENT-LVL III: ICD-10-PCS | Mod: PBBFAC,,, | Performed by: INTERNAL MEDICINE

## 2019-12-03 PROCEDURE — 99999 PR PBB SHADOW E&M-EST. PATIENT-LVL III: CPT | Mod: PBBFAC,,, | Performed by: INTERNAL MEDICINE

## 2019-12-03 RX ORDER — ESCITALOPRAM OXALATE 20 MG/1
20 TABLET ORAL DAILY
Qty: 90 TABLET | Refills: 3 | Status: SHIPPED | OUTPATIENT
Start: 2019-12-03 | End: 2021-08-02

## 2019-12-03 RX ORDER — ESCITALOPRAM OXALATE 20 MG/1
1 TABLET ORAL DAILY
Refills: 3 | COMMUNITY
Start: 2019-11-13 | End: 2019-12-03 | Stop reason: SDUPTHER

## 2019-12-03 NOTE — PROGRESS NOTES
[facility] Note    Chief Complaint      Chief Complaint   Patient presents with    Establish Care       History of Present Illness      Geam De Oliveira is a 39 y.o. WF with GERD, IBS, HLD, Anxiety, Fam h/o CAD presents to re-est care and to fu chronic issues.  Last visit with me was at [facility] on 19.    Anxiety - Pt on Lexapro 20 mg/d.     HLD -  HDL 85 TG 64  19. Rec Statin. Pt has strong fam h/o CAD. Rec low chol diet and exercise for lifestyle modification.  I prev rec pt start a statin due to her fam h/o CAD.  Warned pt about potential S.E. including elev LFT's, myalgias, muscle weakness, memory issues, and hyperglycemia.  Will repeat FLP and consider starting statin. Could also consider Cards referral for 2nd opinion.      IBS - Stable.  Pt not on meds for this.    Nicotine Dependence in Remission - Pt quit in 2019.    Lab review: 19 -  HDL 85 TG 64 ; BUN/Cr - 13/0.75; LFT's - wnl; H/H - 13.3/40      HCM - Flu - none - refuses;  Tdap - 18;  PAP - 3/20/18;  MGM - 19;  Ob/GYN - Dr. Bowers; Well visit - 19    Past Medical History:  Past Medical History:   Diagnosis Date    Alopecia     Anorexia     as a teenager    Anxiety     Esophageal reflux     Hypercholesteremia     IBS (irritable bowel syndrome)     With constipation    Left breast lump     Nicotine dependence in remission      labor in third trimester 2009    Tobacco use        Past Surgical History:   has a past surgical history that includes  section and Cholecystectomy (2018).    Family History:  family history includes Diabetes in her maternal grandfather and maternal grandmother; Heart attack (age of onset: 37) in her father; Heart attack (age of onset: 40) in her paternal grandfather; Heart attack (age of onset: 51) in her mother; Heart disease in her father, mother, and paternal grandfather; Stroke in her paternal grandmother.     Social  History:  Social History     Tobacco Use    Smoking status: Former Smoker     Types: Cigarettes     Last attempt to quit: 2019     Years since quittin.9    Smokeless tobacco: Never Used   Substance Use Topics    Alcohol use: Yes     Frequency: 2-3 times a week     Drinks per session: 1 or 2     Comment: social    Drug use: Never       Review of Systems   Constitutional: Positive for diaphoresis. Negative for chills and fever.        Does not need to change clothes/sheets   HENT: Negative for hearing loss and tinnitus.    Eyes: Negative for blurred vision and double vision.   Respiratory: Negative for cough, shortness of breath and wheezing.    Cardiovascular: Negative for chest pain and palpitations.   Gastrointestinal: Negative for abdominal pain, blood in stool, constipation, diarrhea, heartburn, melena, nausea and vomiting.   Genitourinary: Negative for dysuria and hematuria.   Musculoskeletal: Positive for joint pain.   Skin: Negative for itching and rash.   Neurological: Negative for dizziness and headaches.   Endo/Heme/Allergies: Positive for polydipsia. Does not bruise/bleed easily.   Psychiatric/Behavioral: Negative for depression. The patient is not nervous/anxious.         Medications:  Outpatient Encounter Medications as of 12/3/2019   Medication Sig Dispense Refill    escitalopram oxalate (LEXAPRO) 20 MG tablet Take 1 tablet by mouth once daily.  3    [DISCONTINUED] buPROPion (WELLBUTRIN XL) 150 MG TB24 tablet TAKE 1 TABLET(150 MG) BY MOUTH EVERY DAY (Patient not taking: Reported on 12/3/2019) 30 tablet 0    [DISCONTINUED] clindamycin (CLEOCIN T) 1 % external solution Apply topically 2 (two) times daily. (Patient not taking: Reported on 12/3/2019) 60 mL 3     No facility-administered encounter medications on file as of 12/3/2019.        Current Outpatient Medications:     escitalopram oxalate (LEXAPRO) 20 MG tablet, Take 1 tablet by mouth once daily., Disp: , Rfl: 3    Allergies:  Review of  "patient's allergies indicates:  No Known Allergies    Health Maintenance:    There is no immunization history on file for this patient.   Health Maintenance   Topic Date Due    Pap Smear with HPV Cotest  03/20/2021    Mammogram  04/24/2021    TETANUS VACCINE  05/01/2028    Lipid Panel  Completed      Objective:      Vital Signs  Temp: 98 °F (36.7 °C)  Pulse: (!) 51  Resp: 12  SpO2: 99 %  BP: 108/74  BP Location: Right arm  Patient Position: Sitting  Pain Score: 0-No pain  Height and Weight  Height: 5' 7" (170.2 cm)  Weight: 70.9 kg (156 lb 4.9 oz)  BSA (Calculated - sq m): 1.83 sq meters  BMI (Calculated): 24.5  Weight in (lb) to have BMI = 25: 159.3]    Laboratory:    LIPIDS:  Recent Labs   Lab 02/02/17  0943   HDL 69   Cholesterol 236 H   Triglycerides 105   LDL Cholesterol 146.0   Hdl/Cholesterol Ratio 29.2   Non-HDL Cholesterol 167   Total Cholesterol/HDL Ratio 3.4       TSH:        A1C:  Recent Labs   Lab 02/02/17  0943   Hemoglobin A1C 5.3     Physical Exam   Constitutional: She is oriented to person, place, and time. She appears well-developed and well-nourished. No distress.   HENT:   Head: Normocephalic and atraumatic.   Right Ear: External ear normal.   Left serous otitis   Eyes: Pupils are equal, round, and reactive to light. Conjunctivae and EOM are normal.   Neck: Normal range of motion. Neck supple. No thyromegaly present.   Cardiovascular: Normal rate, regular rhythm, normal heart sounds and intact distal pulses.   No murmur heard.  Pulmonary/Chest: Effort normal and breath sounds normal. No respiratory distress.   Abdominal: Soft. Bowel sounds are normal. She exhibits no distension.   Musculoskeletal: Normal range of motion.   Neurological: She is alert and oriented to person, place, and time.   Skin: Skin is warm and dry. She is not diaphoretic.   Psychiatric: She has a normal mood and affect.   Nursing note and vitals reviewed.          Assessment:       1. Hyperlipidemia, unspecified " hyperlipidemia type    2. Anxiety        Gema De Oliveira is a 39 y.o.female with:    1. Hyperlipidemia, unspecified hyperlipidemia type  - Lipid panel; Future   I prev rec pt start a statin due to her fam h/o CAD.  Warned pt about potential S.E. including elev LFT's, myalgias, muscle weakness, memory issues, and hyperglycemia.  Will repeat FLP and again consider starting statin. Could also consider Cards referral for 2nd opinion.      2. Anxiety  -Stable.  Cont current regimen.  Refilled Lexapro    3. Acute serous otitis media of left ear, recurrence not specified  -rec autoinsufflation exercises and Loratadine prn.    Chronic conditions status updated as per HPI.  Other than changes above, cont current medications and maintain follow up with specialists.  Return to clinic in 6 mos or sooner if needed.    Melanie Llanes MD  [facility]

## 2019-12-06 ENCOUNTER — PATIENT MESSAGE (OUTPATIENT)
Dept: DERMATOLOGY | Facility: CLINIC | Age: 39
End: 2019-12-06

## 2019-12-09 ENCOUNTER — PATIENT MESSAGE (OUTPATIENT)
Dept: PRIMARY CARE CLINIC | Facility: CLINIC | Age: 39
End: 2019-12-09

## 2019-12-09 LAB
FINAL PATHOLOGIC DIAGNOSIS: NORMAL
GROSS: NORMAL
MICROSCOPIC EXAM: NORMAL

## 2019-12-12 ENCOUNTER — PATIENT MESSAGE (OUTPATIENT)
Dept: DERMATOLOGY | Facility: CLINIC | Age: 39
End: 2019-12-12

## 2019-12-13 NOTE — TELEPHONE ENCOUNTER
Spoke with pt.  is out of the office today. Pt says that's ok, she had decided to take him to a local urgent care. Verb thanks for the call,though.

## 2020-05-20 ENCOUNTER — PATIENT MESSAGE (OUTPATIENT)
Dept: DERMATOLOGY | Facility: CLINIC | Age: 40
End: 2020-05-20

## 2020-05-21 ENCOUNTER — PATIENT MESSAGE (OUTPATIENT)
Dept: DERMATOLOGY | Facility: CLINIC | Age: 40
End: 2020-05-21

## 2020-05-21 ENCOUNTER — OFFICE VISIT (OUTPATIENT)
Dept: DERMATOLOGY | Facility: CLINIC | Age: 40
End: 2020-05-21
Payer: COMMERCIAL

## 2020-05-21 DIAGNOSIS — L28.1 PRURIGO NODULARIS: Primary | ICD-10-CM

## 2020-05-21 PROCEDURE — 99212 PR OFFICE/OUTPT VISIT, EST, LEVL II, 10-19 MIN: ICD-10-PCS | Mod: 95,,, | Performed by: DERMATOLOGY

## 2020-05-21 PROCEDURE — 99212 OFFICE O/P EST SF 10 MIN: CPT | Mod: 95,,, | Performed by: DERMATOLOGY

## 2020-05-21 RX ORDER — HYDROXYZINE HYDROCHLORIDE 50 MG/1
100 TABLET, FILM COATED ORAL NIGHTLY
Qty: 60 TABLET | Refills: 2 | Status: SHIPPED | OUTPATIENT
Start: 2020-05-21 | End: 2020-08-06

## 2020-05-21 NOTE — PROGRESS NOTES
Subjective:       Patient ID:  Gema De Oliveira is a 39 y.o. female who presents for No chief complaint on file.    Patient is here for spots of the face, x 3 weeks has been out in the sun, tried treating it with vaseline without relief      Review of Systems   Skin: Positive for itching, rash, dry skin and recent sunburn.   Hematologic/Lymphatic: Does not bruise/bleed easily.        Objective:    Physical Exam   Constitutional: She appears well-developed and well-nourished. No distress.   Neurological: She is alert and oriented to person, place, and time. She is not disoriented.   Psychiatric: She has a normal mood and affect.   Skin:   Areas Examined (abnormalities noted in diagram):   Scalp / Hair Palpated and Inspected  Head / Face Inspection Performed  Neck Inspection Performed              Diagram Legend     Erythematous scaling macule/papule c/w actinic keratosis       Vascular papule c/w angioma      Pigmented verrucoid papule/plaque c/w seborrheic keratosis      Yellow umbilicated papule c/w sebaceous hyperplasia      Irregularly shaped tan macule c/w lentigo     1-2 mm smooth white papules consistent with Milia      Movable subcutaneous cyst with punctum c/w epidermal inclusion cyst      Subcutaneous movable cyst c/w pilar cyst      Firm pink to brown papule c/w dermatofibroma      Pedunculated fleshy papule(s) c/w skin tag(s)      Evenly pigmented macule c/w junctional nevus     Mildly variegated pigmented, slightly irregular-bordered macule c/w mildly atypical nevus      Flesh colored to evenly pigmented papule c/w intradermal nevus       Pink pearly papule/plaque c/w basal cell carcinoma      Erythematous hyperkeratotic cursted plaque c/w SCC      Surgical scar with no sign of skin cancer recurrence      Open and closed comedones      Inflammatory papules and pustules      Verrucoid papule consistent consistent with wart     Erythematous eczematous patches and plaques     Dystrophic onycholytic nail  with subungual debris c/w onychomycosis     Umbilicated papule    Erythematous-base heme-crusted tan verrucoid plaque consistent with inflamed seborrheic keratosis     Erythematous Silvery Scaling Plaque c/w Psoriasis     See annotation          Assessment / Plan:        Prurigo nodularis  -     hydrOXYzine (ATARAX) 50 MG tablet; Take 2 tablets (100 mg total) by mouth every evening. Week 1 take 1 tablet, if still itchy start taking 2 tablets. If groggy take 1T.  Dispense: 60 tablet; Refill: 2    Start taking hydroxyzine at night to help with anxiety/itch/sleep- follow instructions on prescription  Stop picking  Apply silicone gel to lesions such as Scar away over the counter twice daily    If needed apply vaseline with a bandaid to avoid picking at area    For your daughter apply hydrocortisone ointment to affected areas at night for 7 days, for lacerations apply silicone gel such as scar away twice daily in a few days should notice healing    Will call in for daughter hydroxyzine as well          No follow-ups on file.

## 2020-05-21 NOTE — PATIENT INSTRUCTIONS
Start taking hydroxyzine at night to help with anxiety/itch/sleep- follow instructions on prescription  Stop picking  Apply silicone gel to lesions such as Scar away over the counter twice daily    If needed apply vaseline with a bandaid to avoid picking at area    For your daughter apply hydrocortisone ointment to affected areas at night for 7 days, for lacerations apply silicone gel such as scar away twice daily in a few days should notice healing    Will call in for daughter hydroxyzine as well

## 2020-06-23 NOTE — PROGRESS NOTES
"[facility] Note    Chief Complaint    No chief complaint on file.      History of Present Illness      Gema De Oliveira is a 39 y.o.  WF with GERD, IBS, HLD, Anxiety, Fam h/o CAD presents to  chronic issues.  Last visit with me was at [facility] on 12/3/19.    The patient location is: "home"  The chief complaint leading to consultation is: "Fu anxiety"    Visit type: audiovisual    Face to Face time with patient: 14  15 minutes of total time spent on the encounter, which includes face to face time and non-face to face time preparing to see the patient (eg, review of tests), Obtaining and/or reviewing separately obtained history, Documenting clinical information in the electronic or other health record, Independently interpreting results (not separately reported) and communicating results to the patient/family/caregiver, or Care coordination (not separately reported).         Each patient to whom he or she provides medical services by telemedicine is:  (1) informed of the relationship between the physician and patient and the respective role of any other health care provider with respect to management of the patient; and (2) notified that he or she may decline to receive medical services by telemedicine and may withdraw from such care at any time.    Notes:      Anxiety - Pt on Lexapro 20 mg/d. "Doing good".  Pt on Hydroxyzine 50 mg po daily prn.      HLD -  HDL 85 TG 64  6/21/19. Pt has strong fam h/o CAD. Rec low chol diet and exercise for lifestyle modification.  I prev rec pt start a statin due to her fam h/o CAD.  Warned pt about potential S.E. including elev LFT's, myalgias, muscle weakness, memory issues, and hyperglycemia.  Will repeat FLP and consider starting statin. Has order. Could also consider Cards referral for 2nd opinion.       IBS - Stable.  Pt not on meds for this.     Nicotine Dependence in Remission - Pt quit in April 2019.     Lab review: 6/21/19 -  HDL 85 TG 64 " ; BUN/Cr - 13/0.75; LFT's - wnl; H/H - 13.3/40       HCM - Flu - none - refuses;  Tdap - 18;  PAP - 3/20/18;  MGM - 19 - has order; Ob/GYN - Dr. Bowers; Well visit - 19; Derm - Dr. Fenton    Past Medical History:  Past Medical History:   Diagnosis Date    Alopecia     Anorexia     as a teenager    Anxiety     Esophageal reflux     Hypercholesteremia     IBS (irritable bowel syndrome)     With constipation    Left breast lump     Nicotine dependence in remission      labor in third trimester 2009    Tobacco use        Past Surgical History:   has a past surgical history that includes  section and Cholecystectomy (2018).    Family History:  family history includes Diabetes in her maternal grandfather and maternal grandmother; Heart attack (age of onset: 37) in her father; Heart attack (age of onset: 40) in her paternal grandfather; Heart attack (age of onset: 51) in her mother; Heart disease in her father, mother, and paternal grandfather; Stroke in her paternal grandmother.     Social History:  Social History     Tobacco Use    Smoking status: Former Smoker     Types: Cigarettes     Quit date:      Years since quittin.4    Smokeless tobacco: Never Used   Substance Use Topics    Alcohol use: Yes     Frequency: 2-3 times a week     Drinks per session: 1 or 2     Comment: social    Drug use: Never       Review of Systems   HENT: Negative for hearing loss.    Eyes: Negative for discharge.   Respiratory: Negative for wheezing.    Cardiovascular: Negative for chest pain and palpitations.   Gastrointestinal: Negative for blood in stool, constipation, diarrhea and vomiting.   Genitourinary: Negative for dysuria and hematuria.   Musculoskeletal: Positive for joint pain. Negative for neck pain.        Elbows - near antecubital fossa not olecranon fossa   Skin: Positive for rash.        To face from mask - saw Derm.  Pt is on Hydroxyzine prn.    Neurological:  Negative for tingling, weakness and headaches.   Endo/Heme/Allergies: Negative for polydipsia.        Medications:  Outpatient Encounter Medications as of 6/25/2020   Medication Sig Dispense Refill    escitalopram oxalate (LEXAPRO) 20 MG tablet Take 1 tablet (20 mg total) by mouth once daily. 90 tablet 3    hydrOXYzine (ATARAX) 50 MG tablet Take 2 tablets (100 mg total) by mouth every evening. Week 1 take 1 tablet, if still itchy start taking 2 tablets. If groggy take 1T. 60 tablet 2     No facility-administered encounter medications on file as of 6/25/2020.        Current Outpatient Medications:     escitalopram oxalate (LEXAPRO) 20 MG tablet, Take 1 tablet (20 mg total) by mouth once daily., Disp: 90 tablet, Rfl: 3    hydrOXYzine (ATARAX) 50 MG tablet, Take 2 tablets (100 mg total) by mouth every evening. Week 1 take 1 tablet, if still itchy start taking 2 tablets. If groggy take 1T., Disp: 60 tablet, Rfl: 2    Allergies:  Review of patient's allergies indicates:  No Known Allergies    Health Maintenance:    There is no immunization history on file for this patient.   Health Maintenance   Topic Date Due    Mammogram  04/24/2021    TETANUS VACCINE  05/01/2028    Lipid Panel  Completed      Objective:       ]    Laboratory:    Physical Exam  Constitutional:       General: She is not in acute distress.     Appearance: Normal appearance. She is not ill-appearing, toxic-appearing or diaphoretic.   HENT:      Head: Normocephalic and atraumatic.      Nose: No congestion.   Pulmonary:      Effort: No respiratory distress.   Neurological:      General: No focal deficit present.      Mental Status: She is alert and oriented to person, place, and time.   Psychiatric:         Mood and Affect: Mood normal.         Behavior: Behavior normal.             Assessment:       1. Anxiety    2. Hyperlipidemia, unspecified hyperlipidemia type        Gema De Oliveira is a 39 y.o.female with:    1. Anxiety  -Doing well on  current regimen.  She will alert me if this changes.     2. Hyperlipidemia, unspecified hyperlipidemia type  -FLP as prev ordered.  She deferred due to current pandemic.      Chronic conditions status updated as per HPI.  Other than changes above, cont current medications and maintain follow up with specialists.  Return to clinic in 6 months or sooner if needed for well visit.    Melanie Llanes MD  [facility]                  Answers for HPI/ROS submitted by the patient on 6/24/2020   activity change: No  unexpected weight change: No  rhinorrhea: No  trouble swallowing: No  visual disturbance: No  chest tightness: No  polyuria: No  difficulty urinating: No  menstrual problem: No  joint swelling: No  arthralgias: Yes  confusion: No  dysphoric mood: No

## 2020-06-25 ENCOUNTER — OFFICE VISIT (OUTPATIENT)
Dept: PRIMARY CARE CLINIC | Facility: CLINIC | Age: 40
End: 2020-06-25
Payer: COMMERCIAL

## 2020-06-25 DIAGNOSIS — E78.5 HYPERLIPIDEMIA, UNSPECIFIED HYPERLIPIDEMIA TYPE: ICD-10-CM

## 2020-06-25 DIAGNOSIS — F41.9 ANXIETY: Primary | ICD-10-CM

## 2020-06-25 PROCEDURE — 99213 OFFICE O/P EST LOW 20 MIN: CPT | Mod: 95,,, | Performed by: INTERNAL MEDICINE

## 2020-06-25 PROCEDURE — 99213 PR OFFICE/OUTPT VISIT, EST, LEVL III, 20-29 MIN: ICD-10-PCS | Mod: 95,,, | Performed by: INTERNAL MEDICINE

## 2020-07-28 ENCOUNTER — PATIENT MESSAGE (OUTPATIENT)
Dept: PRIMARY CARE CLINIC | Facility: CLINIC | Age: 40
End: 2020-07-28

## 2020-09-18 ENCOUNTER — PATIENT MESSAGE (OUTPATIENT)
Dept: DERMATOLOGY | Facility: CLINIC | Age: 40
End: 2020-09-18

## 2020-10-13 ENCOUNTER — PATIENT MESSAGE (OUTPATIENT)
Dept: DERMATOLOGY | Facility: CLINIC | Age: 40
End: 2020-10-13

## 2020-10-15 ENCOUNTER — PATIENT MESSAGE (OUTPATIENT)
Dept: DERMATOLOGY | Facility: CLINIC | Age: 40
End: 2020-10-15

## 2020-10-18 ENCOUNTER — PATIENT OUTREACH (OUTPATIENT)
Dept: ADMINISTRATIVE | Facility: OTHER | Age: 40
End: 2020-10-18

## 2020-10-19 ENCOUNTER — PATIENT MESSAGE (OUTPATIENT)
Dept: DERMATOLOGY | Facility: CLINIC | Age: 40
End: 2020-10-19

## 2020-11-17 ENCOUNTER — PATIENT MESSAGE (OUTPATIENT)
Dept: DERMATOLOGY | Facility: CLINIC | Age: 40
End: 2020-11-17

## 2020-11-17 ENCOUNTER — PATIENT MESSAGE (OUTPATIENT)
Dept: PRIMARY CARE CLINIC | Facility: CLINIC | Age: 40
End: 2020-11-17

## 2020-12-15 ENCOUNTER — PATIENT MESSAGE (OUTPATIENT)
Dept: PRIMARY CARE CLINIC | Facility: CLINIC | Age: 40
End: 2020-12-15

## 2020-12-15 DIAGNOSIS — Z20.822 EXPOSURE TO COVID-19 VIRUS: Primary | ICD-10-CM

## 2020-12-15 NOTE — TELEPHONE ENCOUNTER
I put in the order. She should continue to monitor for symptoms and call with any concerns.     Dr. LOUIS

## 2020-12-15 NOTE — TELEPHONE ENCOUNTER
I jewels Greene sign pended orders. 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

## 2020-12-28 ENCOUNTER — PATIENT MESSAGE (OUTPATIENT)
Dept: PRIMARY CARE CLINIC | Facility: CLINIC | Age: 40
End: 2020-12-28

## 2020-12-28 ENCOUNTER — PATIENT MESSAGE (OUTPATIENT)
Dept: DERMATOLOGY | Facility: CLINIC | Age: 40
End: 2020-12-28

## 2020-12-28 DIAGNOSIS — Z20.822 EXPOSURE TO COVID-19 VIRUS: Primary | ICD-10-CM

## 2020-12-28 DIAGNOSIS — Z01.84 ENCOUNTER FOR ANTIBODY RESPONSE EXAMINATION: ICD-10-CM

## 2020-12-30 ENCOUNTER — PATIENT MESSAGE (OUTPATIENT)
Dept: PRIMARY CARE CLINIC | Facility: CLINIC | Age: 40
End: 2020-12-30

## 2020-12-31 ENCOUNTER — LAB VISIT (OUTPATIENT)
Dept: LAB | Facility: HOSPITAL | Age: 40
End: 2020-12-31
Attending: INTERNAL MEDICINE
Payer: COMMERCIAL

## 2020-12-31 ENCOUNTER — TELEPHONE (OUTPATIENT)
Dept: PRIMARY CARE CLINIC | Facility: CLINIC | Age: 40
End: 2020-12-31

## 2020-12-31 DIAGNOSIS — Z20.822 EXPOSURE TO COVID-19 VIRUS: ICD-10-CM

## 2020-12-31 DIAGNOSIS — E78.5 HYPERLIPIDEMIA, UNSPECIFIED HYPERLIPIDEMIA TYPE: ICD-10-CM

## 2020-12-31 DIAGNOSIS — Z01.84 ENCOUNTER FOR ANTIBODY RESPONSE EXAMINATION: ICD-10-CM

## 2020-12-31 LAB
CHOLEST SERPL-MCNC: 216 MG/DL (ref 120–199)
CHOLEST/HDLC SERPL: 3.1 {RATIO} (ref 2–5)
HDLC SERPL-MCNC: 70 MG/DL (ref 40–75)
HDLC SERPL: 32.4 % (ref 20–50)
LDLC SERPL CALC-MCNC: 130.8 MG/DL (ref 63–159)
NONHDLC SERPL-MCNC: 146 MG/DL
SARS-COV-2 IGG SERPLBLD QL IA.RAPID: NEGATIVE
TRIGL SERPL-MCNC: 76 MG/DL (ref 30–150)

## 2020-12-31 PROCEDURE — 80061 LIPID PANEL: CPT

## 2020-12-31 PROCEDURE — 86769 SARS-COV-2 COVID-19 ANTIBODY: CPT

## 2020-12-31 NOTE — TELEPHONE ENCOUNTER
JAMES for patient informing that Dr. Llanes sent a portal message regarding your test results.  If you have a problem viewing the message, you can call the office back at [phone].

## 2020-12-31 NOTE — TELEPHONE ENCOUNTER
----- Message from Melanie Llanes MD sent at 12/31/2020  2:54 PM CST -----  I sent pt a my chart message -  I reviewed your labs and your COVID antibody test was negative.   Your Total cholesterol was high because your good cholesterol (HDL) was high. This is a good thing. Your bad cholesterol (LDL) was borderline. I recommend you follow a low cholesterol diet and exercise. We will continue to monitor this due to your strong family history of heart disease.     Dr. LOUIS

## 2021-01-12 DIAGNOSIS — Z12.31 OTHER SCREENING MAMMOGRAM: ICD-10-CM

## 2021-04-05 ENCOUNTER — PATIENT MESSAGE (OUTPATIENT)
Dept: ADMINISTRATIVE | Facility: HOSPITAL | Age: 41
End: 2021-04-05

## 2021-04-15 ENCOUNTER — PATIENT MESSAGE (OUTPATIENT)
Dept: RESEARCH | Facility: HOSPITAL | Age: 41
End: 2021-04-15

## 2021-07-06 ENCOUNTER — PATIENT OUTREACH (OUTPATIENT)
Dept: ADMINISTRATIVE | Facility: HOSPITAL | Age: 41
End: 2021-07-06

## 2021-07-06 ENCOUNTER — PATIENT MESSAGE (OUTPATIENT)
Dept: ADMINISTRATIVE | Facility: HOSPITAL | Age: 41
End: 2021-07-06

## 2021-07-07 ENCOUNTER — PATIENT OUTREACH (OUTPATIENT)
Dept: ADMINISTRATIVE | Facility: HOSPITAL | Age: 41
End: 2021-07-07

## 2021-07-07 ENCOUNTER — PATIENT MESSAGE (OUTPATIENT)
Dept: ADMINISTRATIVE | Facility: HOSPITAL | Age: 41
End: 2021-07-07

## 2021-07-22 LAB
HPV MRNA E6/E7: NOT DETECTED
PAP RECOMMENDATION EXT: NORMAL

## 2021-07-29 ENCOUNTER — PATIENT OUTREACH (OUTPATIENT)
Dept: ADMINISTRATIVE | Facility: OTHER | Age: 41
End: 2021-07-29

## 2021-08-02 ENCOUNTER — OFFICE VISIT (OUTPATIENT)
Dept: CARDIOLOGY | Facility: CLINIC | Age: 41
End: 2021-08-02
Payer: COMMERCIAL

## 2021-08-02 ENCOUNTER — LAB VISIT (OUTPATIENT)
Dept: LAB | Facility: HOSPITAL | Age: 41
End: 2021-08-02
Attending: INTERNAL MEDICINE
Payer: COMMERCIAL

## 2021-08-02 ENCOUNTER — DOCUMENTATION ONLY (OUTPATIENT)
Dept: CARDIOLOGY | Facility: CLINIC | Age: 41
End: 2021-08-02

## 2021-08-02 VITALS
HEART RATE: 58 BPM | SYSTOLIC BLOOD PRESSURE: 114 MMHG | DIASTOLIC BLOOD PRESSURE: 71 MMHG | HEIGHT: 67 IN | WEIGHT: 158.94 LBS | BODY MASS INDEX: 24.94 KG/M2

## 2021-08-02 DIAGNOSIS — F41.9 ANXIETY: Primary | ICD-10-CM

## 2021-08-02 DIAGNOSIS — F41.9 ANXIETY: ICD-10-CM

## 2021-08-02 DIAGNOSIS — E78.5 HYPERLIPIDEMIA, UNSPECIFIED HYPERLIPIDEMIA TYPE: ICD-10-CM

## 2021-08-02 LAB
ALBUMIN SERPL BCP-MCNC: 4.1 G/DL (ref 3.5–5.2)
ALP SERPL-CCNC: 61 U/L (ref 55–135)
ALT SERPL W/O P-5'-P-CCNC: 18 U/L (ref 10–44)
ANION GAP SERPL CALC-SCNC: 9 MMOL/L (ref 8–16)
AST SERPL-CCNC: 17 U/L (ref 10–40)
BILIRUB SERPL-MCNC: 0.5 MG/DL (ref 0.1–1)
BUN SERPL-MCNC: 11 MG/DL (ref 6–20)
CALCIUM SERPL-MCNC: 9.8 MG/DL (ref 8.7–10.5)
CHLORIDE SERPL-SCNC: 107 MMOL/L (ref 95–110)
CHOLEST SERPL-MCNC: 209 MG/DL (ref 120–199)
CHOLEST/HDLC SERPL: 2.6 {RATIO} (ref 2–5)
CO2 SERPL-SCNC: 26 MMOL/L (ref 23–29)
CREAT SERPL-MCNC: 0.8 MG/DL (ref 0.5–1.4)
EST. GFR  (AFRICAN AMERICAN): >60 ML/MIN/1.73 M^2
EST. GFR  (NON AFRICAN AMERICAN): >60 ML/MIN/1.73 M^2
GLUCOSE SERPL-MCNC: 89 MG/DL (ref 70–110)
HDLC SERPL-MCNC: 80 MG/DL (ref 40–75)
HDLC SERPL: 38.3 % (ref 20–50)
LDLC SERPL CALC-MCNC: 118.8 MG/DL (ref 63–159)
NONHDLC SERPL-MCNC: 129 MG/DL
POTASSIUM SERPL-SCNC: 4.8 MMOL/L (ref 3.5–5.1)
PROT SERPL-MCNC: 7.2 G/DL (ref 6–8.4)
SODIUM SERPL-SCNC: 142 MMOL/L (ref 136–145)
TRIGL SERPL-MCNC: 51 MG/DL (ref 30–150)
TSH SERPL DL<=0.005 MIU/L-ACNC: 1.44 UIU/ML (ref 0.4–4)

## 2021-08-02 PROCEDURE — 93010 EKG 12-LEAD: ICD-10-PCS | Mod: S$GLB,,, | Performed by: INTERNAL MEDICINE

## 2021-08-02 PROCEDURE — 36415 COLL VENOUS BLD VENIPUNCTURE: CPT | Mod: PO | Performed by: INTERNAL MEDICINE

## 2021-08-02 PROCEDURE — 84443 ASSAY THYROID STIM HORMONE: CPT | Performed by: INTERNAL MEDICINE

## 2021-08-02 PROCEDURE — 99999 PR PBB SHADOW E&M-EST. PATIENT-LVL III: ICD-10-PCS | Mod: PBBFAC,,, | Performed by: INTERNAL MEDICINE

## 2021-08-02 PROCEDURE — 93005 EKG 12-LEAD: ICD-10-PCS | Mod: S$GLB,,, | Performed by: INTERNAL MEDICINE

## 2021-08-02 PROCEDURE — 99999 PR PBB SHADOW E&M-EST. PATIENT-LVL III: CPT | Mod: PBBFAC,,, | Performed by: INTERNAL MEDICINE

## 2021-08-02 PROCEDURE — 99203 OFFICE O/P NEW LOW 30 MIN: CPT | Mod: S$GLB,,, | Performed by: INTERNAL MEDICINE

## 2021-08-02 PROCEDURE — 93010 ELECTROCARDIOGRAM REPORT: CPT | Mod: S$GLB,,, | Performed by: INTERNAL MEDICINE

## 2021-08-02 PROCEDURE — 80061 LIPID PANEL: CPT | Performed by: INTERNAL MEDICINE

## 2021-08-02 PROCEDURE — 80053 COMPREHEN METABOLIC PANEL: CPT | Performed by: INTERNAL MEDICINE

## 2021-08-02 PROCEDURE — 93005 ELECTROCARDIOGRAM TRACING: CPT | Mod: S$GLB,,, | Performed by: INTERNAL MEDICINE

## 2021-08-02 PROCEDURE — 99203 PR OFFICE/OUTPT VISIT, NEW, LEVL III, 30-44 MIN: ICD-10-PCS | Mod: S$GLB,,, | Performed by: INTERNAL MEDICINE

## 2021-08-03 ENCOUNTER — TELEPHONE (OUTPATIENT)
Dept: CARDIOLOGY | Facility: CLINIC | Age: 41
End: 2021-08-03

## 2021-10-05 ENCOUNTER — PATIENT MESSAGE (OUTPATIENT)
Dept: ADMINISTRATIVE | Facility: HOSPITAL | Age: 41
End: 2021-10-05

## 2021-10-18 LAB — BCS RECOMMENDATION EXT: NORMAL

## 2022-01-18 ENCOUNTER — PATIENT MESSAGE (OUTPATIENT)
Dept: ADMINISTRATIVE | Facility: HOSPITAL | Age: 42
End: 2022-01-18
Payer: COMMERCIAL

## 2022-01-18 NOTE — LETTER
AUTHORIZATION FOR RELEASE OF   CONFIDENTIAL INFORMATION    TO: [facility] Records,    We are seeing Gema De Oliveira, date of birth 1980, in the clinic at [facility]. Melanie Llanes MD is the patient's PCP. Gema De Oliveira has an outstanding lab/procedure at the time we reviewed her chart. In order to help keep her health information updated, she has authorized us to request the following medical record(s):        ( X )  MAMMOGRAM                                      (  )  COLONOSCOPY      ( X )  PAP SMEAR                                          (  )  OUTSIDE LAB RESULTS     (  )  DEXA SCAN                                          (  )  EYE EXAM            (  )  FOOT EXAM                                          (  )  ENTIRE RECORD     (  )  OUTSIDE IMMUNIZATIONS                 (  )  _______________         Please fax records to [facility], Nicole Giambrone, MD, [phone]     If you have any questions, please contact Ayesha at [phone].           Patient Name: Gema De Oliveira  : 1980  Patient Phone #: [phone]

## 2022-03-13 DIAGNOSIS — Z12.31 OTHER SCREENING MAMMOGRAM: ICD-10-CM

## 2022-03-14 ENCOUNTER — TELEPHONE (OUTPATIENT)
Dept: PRIMARY CARE CLINIC | Facility: CLINIC | Age: 42
End: 2022-03-14
Payer: COMMERCIAL

## 2022-03-15 NOTE — TELEPHONE ENCOUNTER
Spoke with patient regarding scheduling a follow up appt. Patient insisted on calling back to schedule.

## 2022-05-30 ENCOUNTER — PATIENT MESSAGE (OUTPATIENT)
Dept: ADMINISTRATIVE | Facility: HOSPITAL | Age: 42
End: 2022-05-30
Payer: COMMERCIAL

## 2022-06-06 ENCOUNTER — PATIENT OUTREACH (OUTPATIENT)
Dept: ADMINISTRATIVE | Facility: HOSPITAL | Age: 42
End: 2022-06-06
Payer: COMMERCIAL

## 2022-06-06 NOTE — LETTER
AUTHORIZATION FOR RELEASE OF   CONFIDENTIAL INFORMATION    TO: [facility] Records,    We are seeing Gema De Oliveira, date of birth 1980, in the clinic at [facility]. Melanie Llanes MD is the patient's PCP. Gema De Oliveira has an outstanding lab/procedure at the time we reviewed her chart. In order to help keep her health information updated, she has authorized us to request the following medical record(s):        ( X )  MAMMOGRAM                                      (  )  COLONOSCOPY      ( X )  PAP SMEAR                                          (  )  OUTSIDE LAB RESULTS     (  )  DEXA SCAN                                          (  )  EYE EXAM            (  )  FOOT EXAM                                          (  )  ENTIRE RECORD     (  )  OUTSIDE IMMUNIZATIONS                 (  )  _______________         Please fax records to [facility], Nicole Giambrone, MD, [phone]     If you have any questions, please contact Ayesha at [phone].           Patient Name: Gema De Oliveira  : 1980  Patient Phone #: [phone]

## 2022-06-06 NOTE — PROGRESS NOTES
Patient due for the following    Hepatitis C Screening     HIV Screening     Mammogram     COVID-19 Vaccine (2 - Pfizer series)    Cervical Cancer Screening       E-faxed [facility] for mammogram and pap records.    Immunizations: reviewed and updated  Care Everywhere: triggered  Care Teams: up to date  Outreach: completed

## 2022-06-16 ENCOUNTER — PATIENT OUTREACH (OUTPATIENT)
Dept: ADMINISTRATIVE | Facility: HOSPITAL | Age: 42
End: 2022-06-16
Payer: COMMERCIAL

## 2022-06-16 NOTE — PROGRESS NOTES
Patient due for the following    Hepatitis C Screening     HIV Screening     COVID-19 Vaccine (3 - Booster for Pfizer series)     Received mammogram and pap records.  Scanned to media.  updated     Immunizations: reviewed and updated  Care Everywhere: triggered  Care Teams: up to date  Outreach: none needed

## 2023-08-28 ENCOUNTER — OFFICE VISIT (OUTPATIENT)
Dept: OBSTETRICS AND GYNECOLOGY | Facility: CLINIC | Age: 43
End: 2023-08-28
Payer: COMMERCIAL

## 2023-08-28 VITALS
DIASTOLIC BLOOD PRESSURE: 80 MMHG | WEIGHT: 163.38 LBS | SYSTOLIC BLOOD PRESSURE: 132 MMHG | BODY MASS INDEX: 25.59 KG/M2

## 2023-08-28 DIAGNOSIS — Z01.419 ENCOUNTER FOR GYNECOLOGICAL EXAMINATION (GENERAL) (ROUTINE) WITHOUT ABNORMAL FINDINGS: ICD-10-CM

## 2023-08-28 DIAGNOSIS — N90.89 VULVAR LESION: Primary | ICD-10-CM

## 2023-08-28 PROCEDURE — 99396 PREV VISIT EST AGE 40-64: CPT | Mod: 25,S$GLB,, | Performed by: OBSTETRICS & GYNECOLOGY

## 2023-08-28 PROCEDURE — 88342 IMHCHEM/IMCYTCHM 1ST ANTB: CPT | Performed by: PATHOLOGY

## 2023-08-28 PROCEDURE — 88305 TISSUE EXAM BY PATHOLOGIST: CPT | Performed by: PATHOLOGY

## 2023-08-28 PROCEDURE — 88341 PR IHC OR ICC EACH ADD'L SINGLE ANTIBODY  STAINPR: ICD-10-PCS | Mod: 26,,, | Performed by: PATHOLOGY

## 2023-08-28 PROCEDURE — 88305 TISSUE EXAM BY PATHOLOGIST: ICD-10-PCS | Mod: 26,,, | Performed by: PATHOLOGY

## 2023-08-28 PROCEDURE — 88313 SPECIAL STAINS GROUP 2: CPT | Performed by: PATHOLOGY

## 2023-08-28 PROCEDURE — 88342 IMHCHEM/IMCYTCHM 1ST ANTB: CPT | Mod: 26,,, | Performed by: PATHOLOGY

## 2023-08-28 PROCEDURE — 56605 BIOPSY (GYNECOLOGICAL): ICD-10-PCS | Mod: S$GLB,,, | Performed by: OBSTETRICS & GYNECOLOGY

## 2023-08-28 PROCEDURE — 88312 SPECIAL STAINS GROUP 1: CPT | Mod: 26,,, | Performed by: PATHOLOGY

## 2023-08-28 PROCEDURE — 99999 PR PBB SHADOW E&M-EST. PATIENT-LVL III: CPT | Mod: PBBFAC,,, | Performed by: OBSTETRICS & GYNECOLOGY

## 2023-08-28 PROCEDURE — 88312 PR  SPECIAL STAINS,GROUP I: ICD-10-PCS | Mod: 26,,, | Performed by: PATHOLOGY

## 2023-08-28 PROCEDURE — 88342 CHG IMMUNOCYTOCHEMISTRY: ICD-10-PCS | Mod: 26,,, | Performed by: PATHOLOGY

## 2023-08-28 PROCEDURE — 88313 PR  SPECIAL STAINS,GROUP II: ICD-10-PCS | Mod: 26,,, | Performed by: PATHOLOGY

## 2023-08-28 PROCEDURE — 99999 PR PBB SHADOW E&M-EST. PATIENT-LVL III: ICD-10-PCS | Mod: PBBFAC,,, | Performed by: OBSTETRICS & GYNECOLOGY

## 2023-08-28 PROCEDURE — 99396 PR PREVENTIVE VISIT,EST,40-64: ICD-10-PCS | Mod: 25,S$GLB,, | Performed by: OBSTETRICS & GYNECOLOGY

## 2023-08-28 PROCEDURE — 56605 BIOPSY OF VULVA/PERINEUM: CPT | Mod: S$GLB,,, | Performed by: OBSTETRICS & GYNECOLOGY

## 2023-08-28 PROCEDURE — 88305 TISSUE EXAM BY PATHOLOGIST: CPT | Mod: 26,,, | Performed by: PATHOLOGY

## 2023-08-28 PROCEDURE — 88341 IMHCHEM/IMCYTCHM EA ADD ANTB: CPT | Mod: 26,,, | Performed by: PATHOLOGY

## 2023-08-28 PROCEDURE — 88313 SPECIAL STAINS GROUP 2: CPT | Mod: 26,,, | Performed by: PATHOLOGY

## 2023-08-28 PROCEDURE — 88341 IMHCHEM/IMCYTCHM EA ADD ANTB: CPT | Performed by: PATHOLOGY

## 2023-08-28 PROCEDURE — 88312 SPECIAL STAINS GROUP 1: CPT | Performed by: PATHOLOGY

## 2023-08-28 RX ORDER — SERTRALINE HYDROCHLORIDE 50 MG/1
50 TABLET, FILM COATED ORAL
COMMUNITY
Start: 2023-05-31

## 2023-08-28 NOTE — PROCEDURES
Biopsy (Gynecological)    Date/Time: 8/28/2023 10:15 AM    Performed by: Magaly Baptiste MD  Authorized by: Magaly Baptiste MD    Consent Done?:  Yes (Written)  Local anesthesia used?: Yes    Anesthesia:  Local infiltration  Local anesthetic:  Lidocaine 1% without epinephrine  Anesthetic total (ml):  0.5    Biopsy Location:  Vulva  Vulva:     # of lesions:  1  Estimated blood loss (cc):  1     At 6 o'clock at introitus, see media tab for image of lesion  Tolerated well  3 mm punch biopsy done.  At first I did a 2 mm punch but was not able to see the specimen because it was so small so then I did a 3 mm punch which was sent

## 2023-08-28 NOTE — PROGRESS NOTES
Subjective:       Patient ID: Gema De Oliveira is a 43 y.o. female.    Chief Complaint:  Well Woman        History of Present Illness  Gema De Oliveira is a 43 y.o. female  who presents for annual visit. Last seen by me in 2017. Since then periods have become much lighter and are spacing out. Discussed in detail. Overall feeling fine. Partner with vasectomy.     Patient's last menstrual period was 2023 (exact date).   Date of Last Pap: 2021    Review of Systems  Review of Systems   Constitutional:  Negative for chills and fever.   Gastrointestinal:  Negative for abdominal pain, constipation, diarrhea, nausea and vomiting.   Genitourinary:  Negative for dysuria, flank pain, frequency, hematuria, pelvic pain, urgency and vaginal discharge.   Musculoskeletal:  Negative for back pain.   Skin:  Negative for rash.   Neurological:  Negative for headaches.        Objective:   Physical Exam:   Constitutional: She is oriented to person, place, and time. Vital signs are normal. She appears well-developed and well-nourished. No distress.        Pulmonary/Chest: She exhibits no mass. Right breast exhibits no mass, no nipple discharge, no skin change, no tenderness, no bleeding and no swelling. Left breast exhibits no mass, no nipple discharge, no skin change, no tenderness, no bleeding and no swelling. Breasts are symmetrical.        Abdominal: Soft. Bowel sounds are normal. She exhibits no distension and no mass. There is no abdominal tenderness. There is no rebound.     Genitourinary:    Vagina and uterus normal.         There is no rash, tenderness, lesion or injury on the right labia. There is no rash, tenderness, lesion or injury on the left labia. Cervix is normal. Right adnexum displays no mass, no tenderness and no fullness. Left adnexum displays no mass, no tenderness and no fullness. No erythema,  no vaginal discharge, tenderness, rectocele, cystocele or unspecified prolapse of vaginal walls in  the vagina. Cervix exhibits no motion tenderness, no discharge and no friability. Uterus is not deviated, not enlarged, not fixed, not tender and not hosting fibroids.    Genitourinary Comments: Small linear 4 mm dark area in the midline at introitus. Does not look like typical melasma therefore punch biopsy done. See separate procedure note             Musculoskeletal: Normal range of motion and moves all extremeties.      Lymphadenopathy:        Right: No supraclavicular adenopathy present.        Left: No supraclavicular adenopathy present.    Neurological: She is alert and oriented to person, place, and time.    Skin: Skin is warm and dry.    Psychiatric: She has a normal mood and affect. Her behavior is normal. Judgment normal.          Assessment/ Plan:     1. Vulvar lesion  Specimen to Pathology, Ob/Gyn      2. Encounter for gynecological examination (general) (routine) without abnormal findings            No follow-ups on file.    As of April 1, 2021, the Cures Act has been passed nationally. This new law requires that all doctors progress notes, lab results, pathology reports and radiology reports be released IMMEDIATELY to the patient in the patient portal. That means that the results are released to you at the EXACT same time they are released to me. Therefore, with all of the patients that I have I am not able to reply to each patient exactly when the results come in. So there will be a delay from when you see the results to when I see them and have time to come up with a response to send you. Also I only see these results when I am on the computer at work. So if the results come in over the weekend or after 5 pm of a work day, I will not see them until the next business day. As you can tell, this is a challenge as a physician to give every patient the quick response they hope for and deserve. So please be patient! Thanks for understanding, Dr. Baptiste    Answers submitted by the patient for this  visit:  Gynecologic Exam Questionnaire  (Submitted on 2023)  Chief Complaint: Gynecologic exam  genital itching: No  genital lesions: No  genital odor: No  genital rash: No  missed menses: No  vaginal bleeding: No  Pregnant now?: No  anorexia: No  discolored urine: No  painful intercourse: No  Passing clots?: No  Passing tissue?: No  Aggravated by: nothing  Sexual activity: sexually active  Partner with STD symptoms: no  Birth control: nothing  Menstrual history: irregular  STD: No  abdominal surgery: No   section: Yes  Ectopic pregnancy: No  Endometriosis: No  herpes simplex: No  gynecological surgery: No  menorrhagia: No  metrorrhagia: No  miscarriage: No  ovarian cysts: No  perineal abscess: No  PID: No  terminated pregnancy: No  vaginosis: No

## 2023-08-31 ENCOUNTER — PATIENT MESSAGE (OUTPATIENT)
Dept: OBSTETRICS AND GYNECOLOGY | Facility: CLINIC | Age: 43
End: 2023-08-31
Payer: COMMERCIAL

## 2023-09-07 LAB
FINAL PATHOLOGIC DIAGNOSIS: NORMAL
GROSS: NORMAL
Lab: NORMAL
MICROSCOPIC EXAM: NORMAL

## 2023-09-18 NOTE — TELEPHONE ENCOUNTER
I called pt. Had vulvar punch biopsy at the introitis a couple of weeks ago and tried to have sex and it felt like it was tearing. I recommend Cortisone 10 and give it a little longer to heal. Voiced understanding.

## 2023-11-10 ENCOUNTER — PATIENT MESSAGE (OUTPATIENT)
Dept: OBSTETRICS AND GYNECOLOGY | Facility: CLINIC | Age: 43
End: 2023-11-10
Payer: COMMERCIAL

## 2023-11-10 DIAGNOSIS — Z12.31 VISIT FOR SCREENING MAMMOGRAM: Primary | ICD-10-CM

## 2023-11-27 ENCOUNTER — TELEPHONE (OUTPATIENT)
Dept: OBSTETRICS AND GYNECOLOGY | Facility: CLINIC | Age: 43
End: 2023-11-27
Payer: COMMERCIAL

## 2023-11-27 NOTE — TELEPHONE ENCOUNTER
Pt states she has had a cyst under skin on breast about 3in from axilla.  Thinks it popped this weekend as she has soreness and erythema in that area.  Symptoms are not worsening.  She saw a derm about 6mo ago for this who offered to remove it but decided to monitor.  Recommended she call derm, if they aren't able to see her we can get her in to see if she needs antibiotics but if it needs to be removed she would need to see derm.

## 2024-09-06 ENCOUNTER — OFFICE VISIT (OUTPATIENT)
Dept: OBSTETRICS AND GYNECOLOGY | Facility: CLINIC | Age: 44
End: 2024-09-06
Payer: COMMERCIAL

## 2024-09-06 VITALS
WEIGHT: 165.56 LBS | HEIGHT: 67 IN | DIASTOLIC BLOOD PRESSURE: 88 MMHG | BODY MASS INDEX: 25.99 KG/M2 | SYSTOLIC BLOOD PRESSURE: 143 MMHG

## 2024-09-06 DIAGNOSIS — R23.2 HOT FLASHES: ICD-10-CM

## 2024-09-06 DIAGNOSIS — Z01.419 ENCOUNTER FOR GYNECOLOGICAL EXAMINATION (GENERAL) (ROUTINE) WITHOUT ABNORMAL FINDINGS: ICD-10-CM

## 2024-09-06 DIAGNOSIS — Z11.51 ENCOUNTER FOR SCREENING FOR HUMAN PAPILLOMAVIRUS (HPV): ICD-10-CM

## 2024-09-06 DIAGNOSIS — Z12.4 ENCOUNTER FOR PAPANICOLAOU SMEAR FOR CERVICAL CANCER SCREENING: Primary | ICD-10-CM

## 2024-09-06 DIAGNOSIS — N91.4 SECONDARY OLIGOMENORRHEA: ICD-10-CM

## 2024-09-06 PROCEDURE — 99999 PR PBB SHADOW E&M-EST. PATIENT-LVL III: CPT | Mod: PBBFAC,,, | Performed by: OBSTETRICS & GYNECOLOGY

## 2024-09-06 NOTE — Clinical Note
We got distracted talking about her daughter and I did not send her to lab. Can you schedule her for labs and then after that will start HRT if labs c/w menopause

## 2024-09-06 NOTE — PROGRESS NOTES
Subjective:       Patient ID: Gema De Oliveira is a 44 y.o. female.    Chief Complaint:  Annual Exam        History of Present Illness  Gema De Oliveira is a 44 y.o. female  who presents for annual. Overall doing well. Her daughter is having some abdominal issues and urinary retention. Would like a second opinion, discussed. The patient reports no period in 2 months and she has hot flashes and night sweats. Recommend check labs and if c/w menopause will start HRT. Discussed pros and cons of HRT in detail. Patient is interested in trying     Patient's last menstrual period was 2024 (exact date).   Date of Last Pap: 2024    Review of Systems  Review of Systems     Objective:   Physical Exam     Assessment/ Plan:     1. Encounter for Papanicolaou smear for cervical cancer screening  Liquid-Based Pap Smear, Screening      2. Encounter for screening for human papillomavirus (HPV)  HPV High Risk Genotypes, PCR      3. Encounter for gynecological examination (general) (routine) without abnormal findings        4. Secondary oligomenorrhea  Follicle Stimulating Hormone    Estradiol    TSH      5. Hot flashes  Follicle Stimulating Hormone    Estradiol    TSH          No follow-ups on file.    As of 2021, the Cures Act has been passed nationally. This new law requires that all doctors progress notes, lab results, pathology reports and radiology reports be released IMMEDIATELY to the patient in the patient portal. That means that the results are released to you at the EXACT same time they are released to me. Therefore, with all of the patients that I have I am not able to reply to each patient exactly when the results come in. So there will be a delay from when you see the results to when I see them and have time to come up with a response to send you. Also I only see these results when I am on the computer at work. So if the results come in over the weekend or after 5 pm of a work day, I will not  see them until the next business day. As you can tell, this is a challenge as a physician to give every patient the quick response they hope for and deserve. So please be patient! Thanks for understanding, Dr. Baptiste

## 2024-09-09 ENCOUNTER — PATIENT MESSAGE (OUTPATIENT)
Dept: OBSTETRICS AND GYNECOLOGY | Facility: CLINIC | Age: 44
End: 2024-09-09
Payer: COMMERCIAL

## 2024-09-09 DIAGNOSIS — Z12.31 VISIT FOR SCREENING MAMMOGRAM: Primary | ICD-10-CM

## 2024-09-16 ENCOUNTER — LAB VISIT (OUTPATIENT)
Dept: LAB | Facility: HOSPITAL | Age: 44
End: 2024-09-16
Attending: OBSTETRICS & GYNECOLOGY
Payer: COMMERCIAL

## 2024-09-16 DIAGNOSIS — N91.4 SECONDARY OLIGOMENORRHEA: ICD-10-CM

## 2024-09-16 DIAGNOSIS — R23.2 HOT FLASHES: ICD-10-CM

## 2024-09-16 LAB
ESTRADIOL SERPL-MCNC: 11 PG/ML
FSH SERPL-ACNC: 73.87 MIU/ML
T4 FREE SERPL-MCNC: 0.75 NG/DL (ref 0.71–1.51)
TSH SERPL DL<=0.005 MIU/L-ACNC: 13.31 UIU/ML (ref 0.4–4)

## 2024-09-16 PROCEDURE — 82670 ASSAY OF TOTAL ESTRADIOL: CPT | Performed by: OBSTETRICS & GYNECOLOGY

## 2024-09-16 PROCEDURE — 84443 ASSAY THYROID STIM HORMONE: CPT | Performed by: OBSTETRICS & GYNECOLOGY

## 2024-09-16 PROCEDURE — 83001 ASSAY OF GONADOTROPIN (FSH): CPT | Performed by: OBSTETRICS & GYNECOLOGY

## 2024-09-16 PROCEDURE — 36415 COLL VENOUS BLD VENIPUNCTURE: CPT | Performed by: OBSTETRICS & GYNECOLOGY

## 2024-09-16 PROCEDURE — 84439 ASSAY OF FREE THYROXINE: CPT | Performed by: OBSTETRICS & GYNECOLOGY

## 2024-09-18 ENCOUNTER — PATIENT MESSAGE (OUTPATIENT)
Dept: OBSTETRICS AND GYNECOLOGY | Facility: CLINIC | Age: 44
End: 2024-09-18
Payer: COMMERCIAL

## 2024-09-18 DIAGNOSIS — Z78.0 MENOPAUSE: Primary | ICD-10-CM

## 2024-09-18 DIAGNOSIS — E03.9 HYPOTHYROIDISM, UNSPECIFIED TYPE: ICD-10-CM

## 2024-09-18 RX ORDER — ESTRADIOL 1 MG/1
1 TABLET ORAL DAILY
Qty: 90 TABLET | Refills: 3 | Status: SHIPPED | OUTPATIENT
Start: 2024-09-18 | End: 2025-09-18

## 2024-09-18 RX ORDER — PROGESTERONE 100 MG/1
100 CAPSULE ORAL DAILY
Qty: 90 CAPSULE | Refills: 3 | Status: SHIPPED | OUTPATIENT
Start: 2024-09-18 | End: 2025-09-18

## 2024-09-18 NOTE — TELEPHONE ENCOUNTER
I called pt and discussed labs. TSH elevated at 13 and FSH is elevated at 75  Recommend HRT first and we can see how she does. Rec journal entry today to see her baseline symptoms. Start Estradiol 1 mg and Prometrium 100 mg and then do f/u virtual visit in 6 weeks with repeat thyroid labs 1-2 days before her visit. If thyroid is still abnl will add Synthroid  Ziggy, please schedule  Dx- hypothyroid

## 2024-09-25 ENCOUNTER — PATIENT MESSAGE (OUTPATIENT)
Dept: OBSTETRICS AND GYNECOLOGY | Facility: CLINIC | Age: 44
End: 2024-09-25
Payer: COMMERCIAL

## 2024-10-22 ENCOUNTER — PATIENT MESSAGE (OUTPATIENT)
Dept: RESEARCH | Facility: HOSPITAL | Age: 44
End: 2024-10-22
Payer: COMMERCIAL

## 2024-10-25 ENCOUNTER — LAB VISIT (OUTPATIENT)
Dept: LAB | Facility: HOSPITAL | Age: 44
End: 2024-10-25
Attending: OBSTETRICS & GYNECOLOGY
Payer: COMMERCIAL

## 2024-10-25 DIAGNOSIS — E03.9 HYPOTHYROIDISM, UNSPECIFIED TYPE: ICD-10-CM

## 2024-10-25 LAB
T4 FREE SERPL-MCNC: 0.75 NG/DL (ref 0.71–1.51)
TSH SERPL DL<=0.005 MIU/L-ACNC: 4.91 UIU/ML (ref 0.4–4)

## 2024-10-25 PROCEDURE — 84443 ASSAY THYROID STIM HORMONE: CPT | Performed by: OBSTETRICS & GYNECOLOGY

## 2024-10-25 PROCEDURE — 36415 COLL VENOUS BLD VENIPUNCTURE: CPT | Performed by: OBSTETRICS & GYNECOLOGY

## 2024-10-25 PROCEDURE — 84439 ASSAY OF FREE THYROXINE: CPT | Performed by: OBSTETRICS & GYNECOLOGY

## 2024-10-28 ENCOUNTER — PATIENT MESSAGE (OUTPATIENT)
Dept: OBSTETRICS AND GYNECOLOGY | Facility: CLINIC | Age: 44
End: 2024-10-28
Payer: COMMERCIAL

## 2024-11-01 ENCOUNTER — OFFICE VISIT (OUTPATIENT)
Dept: OBSTETRICS AND GYNECOLOGY | Facility: CLINIC | Age: 44
End: 2024-11-01
Payer: COMMERCIAL

## 2024-11-01 DIAGNOSIS — Z78.0 MENOPAUSE: ICD-10-CM

## 2024-11-01 DIAGNOSIS — E03.9 HYPOTHYROIDISM, UNSPECIFIED TYPE: Primary | ICD-10-CM

## 2024-11-01 RX ORDER — LEVOTHYROXINE SODIUM 25 UG/1
25 TABLET ORAL
Qty: 90 TABLET | Refills: 3 | Status: SHIPPED | OUTPATIENT
Start: 2024-11-01 | End: 2025-11-01

## 2024-11-01 RX ORDER — ESTRADIOL 1 MG/1
1 TABLET ORAL DAILY
Qty: 90 TABLET | Refills: 3 | Status: SHIPPED | OUTPATIENT
Start: 2024-11-01 | End: 2025-11-01

## 2024-11-01 RX ORDER — PROGESTERONE 100 MG/1
100 CAPSULE ORAL DAILY
Qty: 90 CAPSULE | Refills: 3 | Status: SHIPPED | OUTPATIENT
Start: 2024-11-01 | End: 2025-11-01

## 2024-12-12 ENCOUNTER — LAB VISIT (OUTPATIENT)
Dept: LAB | Facility: HOSPITAL | Age: 44
End: 2024-12-12
Attending: OBSTETRICS & GYNECOLOGY
Payer: COMMERCIAL

## 2024-12-12 DIAGNOSIS — E03.9 HYPOTHYROIDISM, UNSPECIFIED TYPE: ICD-10-CM

## 2024-12-12 LAB
T4 FREE SERPL-MCNC: 0.82 NG/DL (ref 0.71–1.51)
TSH SERPL DL<=0.005 MIU/L-ACNC: 5.51 UIU/ML (ref 0.4–4)

## 2024-12-12 PROCEDURE — 84439 ASSAY OF FREE THYROXINE: CPT | Performed by: OBSTETRICS & GYNECOLOGY

## 2024-12-12 PROCEDURE — 84443 ASSAY THYROID STIM HORMONE: CPT | Performed by: OBSTETRICS & GYNECOLOGY

## 2024-12-12 PROCEDURE — 36415 COLL VENOUS BLD VENIPUNCTURE: CPT | Performed by: OBSTETRICS & GYNECOLOGY

## 2024-12-16 ENCOUNTER — PATIENT MESSAGE (OUTPATIENT)
Dept: OBSTETRICS AND GYNECOLOGY | Facility: CLINIC | Age: 44
End: 2024-12-16
Payer: COMMERCIAL

## 2024-12-16 DIAGNOSIS — E03.9 HYPOTHYROIDISM, UNSPECIFIED TYPE: Primary | ICD-10-CM

## 2024-12-16 RX ORDER — LEVOTHYROXINE SODIUM 50 UG/1
50 TABLET ORAL
Qty: 30 TABLET | Refills: 11 | Status: SHIPPED | OUTPATIENT
Start: 2024-12-16 | End: 2025-12-16

## 2024-12-17 ENCOUNTER — TELEPHONE (OUTPATIENT)
Dept: OBSTETRICS AND GYNECOLOGY | Facility: CLINIC | Age: 44
End: 2024-12-17
Payer: COMMERCIAL

## 2024-12-17 DIAGNOSIS — E03.9 HYPOTHYROIDISM, UNSPECIFIED TYPE: Primary | ICD-10-CM

## 2024-12-17 NOTE — TELEPHONE ENCOUNTER
----- Message from Magaly Baptiste MD sent at 12/16/2024  1:41 PM CST -----  Schedule lab- TSH and Free T4 in 2 months dx hypothyroid

## 2025-02-03 ENCOUNTER — PATIENT MESSAGE (OUTPATIENT)
Dept: OBSTETRICS AND GYNECOLOGY | Facility: CLINIC | Age: 45
End: 2025-02-03
Payer: COMMERCIAL

## 2025-02-03 DIAGNOSIS — N92.6 IRREGULAR BLEEDING: Primary | ICD-10-CM

## 2025-02-05 ENCOUNTER — TELEPHONE (OUTPATIENT)
Dept: OBSTETRICS AND GYNECOLOGY | Facility: CLINIC | Age: 45
End: 2025-02-05
Payer: COMMERCIAL

## 2025-02-05 NOTE — TELEPHONE ENCOUNTER
She is only 44 and is probably just perimenopausal and having periods again. Have her check a Day 3 FSH, estradiol, TSH if she bleeds again. If that shows her levels are not menopausal then that is the reason. If it appears cyclic I am less concerned then true PMB

## 2025-02-05 NOTE — TELEPHONE ENCOUNTER
@ [id number] Called pt reviewed Dr Pedraza instructions. She will call back if she has the cycle again next month for labs. Pt vu

## 2025-02-12 ENCOUNTER — LAB VISIT (OUTPATIENT)
Dept: LAB | Facility: HOSPITAL | Age: 45
End: 2025-02-12
Attending: OBSTETRICS & GYNECOLOGY
Payer: COMMERCIAL

## 2025-02-12 DIAGNOSIS — E03.9 HYPOTHYROIDISM, UNSPECIFIED TYPE: ICD-10-CM

## 2025-02-12 LAB
T4 FREE SERPL-MCNC: 0.9 NG/DL (ref 0.71–1.51)
TSH SERPL DL<=0.005 MIU/L-ACNC: 3.17 UIU/ML (ref 0.4–4)

## 2025-02-12 PROCEDURE — 84439 ASSAY OF FREE THYROXINE: CPT | Performed by: OBSTETRICS & GYNECOLOGY

## 2025-02-12 PROCEDURE — 84443 ASSAY THYROID STIM HORMONE: CPT | Performed by: OBSTETRICS & GYNECOLOGY

## 2025-02-12 PROCEDURE — 36415 COLL VENOUS BLD VENIPUNCTURE: CPT | Performed by: OBSTETRICS & GYNECOLOGY

## 2025-02-13 ENCOUNTER — PATIENT MESSAGE (OUTPATIENT)
Dept: OBSTETRICS AND GYNECOLOGY | Facility: CLINIC | Age: 45
End: 2025-02-13
Payer: COMMERCIAL

## 2025-03-01 ENCOUNTER — PATIENT MESSAGE (OUTPATIENT)
Dept: OBSTETRICS AND GYNECOLOGY | Facility: CLINIC | Age: 45
End: 2025-03-01
Payer: COMMERCIAL

## 2025-03-01 DIAGNOSIS — N92.6 IRREGULAR BLEEDING: Primary | ICD-10-CM

## 2025-03-03 ENCOUNTER — LAB VISIT (OUTPATIENT)
Dept: LAB | Facility: HOSPITAL | Age: 45
End: 2025-03-03
Attending: OBSTETRICS & GYNECOLOGY
Payer: COMMERCIAL

## 2025-03-03 DIAGNOSIS — N92.6 IRREGULAR BLEEDING: ICD-10-CM

## 2025-03-03 LAB
ESTRADIOL SERPL-MCNC: 115 PG/ML
FSH SERPL-ACNC: 19.73 MIU/ML
LH SERPL-ACNC: 6 MIU/ML
TSH SERPL DL<=0.005 MIU/L-ACNC: 3.1 UIU/ML (ref 0.4–4)

## 2025-03-03 PROCEDURE — 36415 COLL VENOUS BLD VENIPUNCTURE: CPT | Performed by: OBSTETRICS & GYNECOLOGY

## 2025-03-03 PROCEDURE — 82670 ASSAY OF TOTAL ESTRADIOL: CPT | Performed by: OBSTETRICS & GYNECOLOGY

## 2025-03-03 PROCEDURE — 83001 ASSAY OF GONADOTROPIN (FSH): CPT | Performed by: OBSTETRICS & GYNECOLOGY

## 2025-03-03 PROCEDURE — 84443 ASSAY THYROID STIM HORMONE: CPT | Performed by: OBSTETRICS & GYNECOLOGY

## 2025-03-03 PROCEDURE — 83002 ASSAY OF GONADOTROPIN (LH): CPT | Performed by: OBSTETRICS & GYNECOLOGY

## 2025-03-10 ENCOUNTER — RESULTS FOLLOW-UP (OUTPATIENT)
Dept: OBSTETRICS AND GYNECOLOGY | Facility: CLINIC | Age: 45
End: 2025-03-10
Payer: COMMERCIAL

## 2025-03-10 DIAGNOSIS — E03.9 HYPOTHYROIDISM, UNSPECIFIED TYPE: Primary | ICD-10-CM

## 2025-03-10 RX ORDER — LEVOTHYROXINE SODIUM 75 UG/1
75 TABLET ORAL DAILY
Qty: 30 TABLET | Refills: 11 | Status: SHIPPED | OUTPATIENT
Start: 2025-03-10 | End: 2026-03-10

## 2025-03-17 ENCOUNTER — PATIENT MESSAGE (OUTPATIENT)
Dept: OBSTETRICS AND GYNECOLOGY | Facility: CLINIC | Age: 45
End: 2025-03-17
Payer: COMMERCIAL

## 2025-03-17 ENCOUNTER — TELEPHONE (OUTPATIENT)
Dept: OBSTETRICS AND GYNECOLOGY | Facility: CLINIC | Age: 45
End: 2025-03-17
Payer: COMMERCIAL

## 2025-03-17 NOTE — TELEPHONE ENCOUNTER
Emile pt called in states she is having a side effect form the medication levothyroxine (SYNTHROID) 75 MCG tablet  pt would like to discuss pt also sent in 2 portal messages as well     See other message

## 2025-05-22 ENCOUNTER — PATIENT MESSAGE (OUTPATIENT)
Dept: OBSTETRICS AND GYNECOLOGY | Facility: CLINIC | Age: 45
End: 2025-05-22
Payer: COMMERCIAL

## 2025-06-30 ENCOUNTER — PATIENT MESSAGE (OUTPATIENT)
Dept: OBSTETRICS AND GYNECOLOGY | Facility: CLINIC | Age: 45
End: 2025-06-30
Payer: COMMERCIAL

## 2025-08-22 DIAGNOSIS — Z78.0 MENOPAUSE: ICD-10-CM

## 2025-08-22 DIAGNOSIS — E03.9 HYPOTHYROIDISM, UNSPECIFIED TYPE: ICD-10-CM

## 2025-08-22 RX ORDER — PROGESTERONE 100 MG/1
100 CAPSULE ORAL DAILY
Qty: 90 CAPSULE | Refills: 0 | Status: SHIPPED | OUTPATIENT
Start: 2025-08-22

## 2025-08-22 RX ORDER — LEVOTHYROXINE SODIUM 75 UG/1
75 TABLET ORAL DAILY
Qty: 90 TABLET | Refills: 0 | Status: SHIPPED | OUTPATIENT
Start: 2025-08-22

## 2025-08-24 RX ORDER — ESTRADIOL 1 MG/1
1 TABLET ORAL DAILY
Qty: 90 TABLET | Refills: 0 | Status: SHIPPED | OUTPATIENT
Start: 2025-08-24